# Patient Record
Sex: FEMALE | Race: WHITE | NOT HISPANIC OR LATINO | Employment: FULL TIME | URBAN - METROPOLITAN AREA
[De-identification: names, ages, dates, MRNs, and addresses within clinical notes are randomized per-mention and may not be internally consistent; named-entity substitution may affect disease eponyms.]

---

## 2020-12-07 PROCEDURE — U0003 INFECTIOUS AGENT DETECTION BY NUCLEIC ACID (DNA OR RNA); SEVERE ACUTE RESPIRATORY SYNDROME CORONAVIRUS 2 (SARS-COV-2) (CORONAVIRUS DISEASE [COVID-19]), AMPLIFIED PROBE TECHNIQUE, MAKING USE OF HIGH THROUGHPUT TECHNOLOGIES AS DESCRIBED BY CMS-2020-01-R: HCPCS | Performed by: FAMILY MEDICINE

## 2023-06-15 PROCEDURE — 93005 ELECTROCARDIOGRAM TRACING: CPT | Performed by: EMERGENCY MEDICINE

## 2023-06-15 PROCEDURE — 93005 ELECTROCARDIOGRAM TRACING: CPT

## 2023-06-16 ENCOUNTER — APPOINTMENT (OUTPATIENT)
Dept: MRI IMAGING | Facility: HOSPITAL | Age: 28
End: 2023-06-16
Payer: MEDICAID

## 2023-06-16 ENCOUNTER — HOSPITAL ENCOUNTER (OUTPATIENT)
Facility: HOSPITAL | Age: 28
Setting detail: OBSERVATION
Discharge: HOME | End: 2023-06-17
Attending: EMERGENCY MEDICINE | Admitting: EMERGENCY MEDICINE
Payer: MEDICAID

## 2023-06-16 ENCOUNTER — APPOINTMENT (OUTPATIENT)
Dept: GENERAL RADIOLOGY | Facility: HOSPITAL | Age: 28
End: 2023-06-16
Payer: MEDICAID

## 2023-06-16 ENCOUNTER — APPOINTMENT (OUTPATIENT)
Dept: CT IMAGING | Facility: HOSPITAL | Age: 28
End: 2023-06-16
Payer: MEDICAID

## 2023-06-16 ENCOUNTER — HOSPITAL ENCOUNTER (EMERGENCY)
Facility: HOSPITAL | Age: 28
Discharge: HOME | End: 2023-06-16
Attending: EMERGENCY MEDICINE
Payer: MEDICAID

## 2023-06-16 VITALS
OXYGEN SATURATION: 100 % | TEMPERATURE: 98.9 F | RESPIRATION RATE: 20 BRPM | WEIGHT: 293 LBS | SYSTOLIC BLOOD PRESSURE: 122 MMHG | HEART RATE: 88 BPM | DIASTOLIC BLOOD PRESSURE: 76 MMHG | BODY MASS INDEX: 48.82 KG/M2 | HEIGHT: 65 IN

## 2023-06-16 DIAGNOSIS — R51.9 ACUTE NONINTRACTABLE HEADACHE, UNSPECIFIED HEADACHE TYPE: Primary | ICD-10-CM

## 2023-06-16 DIAGNOSIS — R07.9 CHEST PAIN, UNSPECIFIED TYPE: ICD-10-CM

## 2023-06-16 DIAGNOSIS — R55 SYNCOPE AND COLLAPSE: Primary | ICD-10-CM

## 2023-06-16 LAB
ALBUMIN SERPL-MCNC: 4.2 G/DL (ref 3.5–5.2)
ALBUMIN SERPL-MCNC: 4.4 G/DL (ref 3.5–5.2)
ALBUMIN/GLOB SERPL: 1.3 G/DL
ALBUMIN/GLOB SERPL: 1.3 G/DL
ALP SERPL-CCNC: 58 U/L (ref 39–117)
ALP SERPL-CCNC: 59 U/L (ref 39–117)
ALT SERPL W P-5'-P-CCNC: 16 U/L (ref 1–33)
ALT SERPL W P-5'-P-CCNC: 25 U/L (ref 1–33)
ANION GAP SERPL CALCULATED.3IONS-SCNC: 13 MMOL/L (ref 5–15)
ANION GAP SERPL CALCULATED.3IONS-SCNC: 14 MMOL/L (ref 5–15)
AST SERPL-CCNC: 11 U/L (ref 1–32)
AST SERPL-CCNC: 26 U/L (ref 1–32)
B-HCG UR QL: NEGATIVE
BASOPHILS # BLD AUTO: 0.1 10*3/MM3 (ref 0–0.2)
BASOPHILS # BLD AUTO: 0.1 10*3/MM3 (ref 0–0.2)
BASOPHILS NFR BLD AUTO: 0.6 % (ref 0–1.5)
BASOPHILS NFR BLD AUTO: 0.9 % (ref 0–1.5)
BILIRUB SERPL-MCNC: 0.3 MG/DL (ref 0–1.2)
BILIRUB SERPL-MCNC: <0.2 MG/DL (ref 0–1.2)
BILIRUB UR QL STRIP: NEGATIVE
BUN SERPL-MCNC: 10 MG/DL (ref 6–20)
BUN SERPL-MCNC: 10 MG/DL (ref 6–20)
BUN/CREAT SERPL: 15.6 (ref 7–25)
BUN/CREAT SERPL: 17.5 (ref 7–25)
CALCIUM SPEC-SCNC: 9.2 MG/DL (ref 8.6–10.5)
CALCIUM SPEC-SCNC: 9.4 MG/DL (ref 8.6–10.5)
CHLORIDE SERPL-SCNC: 101 MMOL/L (ref 98–107)
CHLORIDE SERPL-SCNC: 102 MMOL/L (ref 98–107)
CLARITY UR: CLEAR
CO2 SERPL-SCNC: 24 MMOL/L (ref 22–29)
CO2 SERPL-SCNC: 24 MMOL/L (ref 22–29)
COLOR UR: YELLOW
CREAT SERPL-MCNC: 0.57 MG/DL (ref 0.57–1)
CREAT SERPL-MCNC: 0.64 MG/DL (ref 0.57–1)
D DIMER PPP FEU-MCNC: 0.61 MG/L (FEU) (ref 0–0.5)
DEPRECATED RDW RBC AUTO: 42.4 FL (ref 37–54)
DEPRECATED RDW RBC AUTO: 43.3 FL (ref 37–54)
EGFRCR SERPLBLD CKD-EPI 2021: 125.2 ML/MIN/1.73
EGFRCR SERPLBLD CKD-EPI 2021: 128.7 ML/MIN/1.73
EOSINOPHIL # BLD AUTO: 0.1 10*3/MM3 (ref 0–0.4)
EOSINOPHIL # BLD AUTO: 0.2 10*3/MM3 (ref 0–0.4)
EOSINOPHIL NFR BLD AUTO: 0.8 % (ref 0.3–6.2)
EOSINOPHIL NFR BLD AUTO: 1.3 % (ref 0.3–6.2)
ERYTHROCYTE [DISTWIDTH] IN BLOOD BY AUTOMATED COUNT: 14.7 % (ref 12.3–15.4)
ERYTHROCYTE [DISTWIDTH] IN BLOOD BY AUTOMATED COUNT: 14.9 % (ref 12.3–15.4)
GLOBULIN UR ELPH-MCNC: 3.2 GM/DL
GLOBULIN UR ELPH-MCNC: 3.3 GM/DL
GLUCOSE SERPL-MCNC: 115 MG/DL (ref 65–99)
GLUCOSE SERPL-MCNC: 124 MG/DL (ref 65–99)
GLUCOSE UR STRIP-MCNC: NEGATIVE MG/DL
HCT VFR BLD AUTO: 40.4 % (ref 34–46.6)
HCT VFR BLD AUTO: 42.2 % (ref 34–46.6)
HGB BLD-MCNC: 13.2 G/DL (ref 12–15.9)
HGB BLD-MCNC: 13.7 G/DL (ref 12–15.9)
HGB UR QL STRIP.AUTO: NEGATIVE
KETONES UR QL STRIP: NEGATIVE
LEUKOCYTE ESTERASE UR QL STRIP.AUTO: NEGATIVE
LYMPHOCYTES # BLD AUTO: 1.9 10*3/MM3 (ref 0.7–3.1)
LYMPHOCYTES # BLD AUTO: 2.6 10*3/MM3 (ref 0.7–3.1)
LYMPHOCYTES NFR BLD AUTO: 17.9 % (ref 19.6–45.3)
LYMPHOCYTES NFR BLD AUTO: 21.1 % (ref 19.6–45.3)
MAGNESIUM SERPL-MCNC: 2 MG/DL (ref 1.6–2.6)
MCH RBC QN AUTO: 25.4 PG (ref 26.6–33)
MCH RBC QN AUTO: 25.6 PG (ref 26.6–33)
MCHC RBC AUTO-ENTMCNC: 32.6 G/DL (ref 31.5–35.7)
MCHC RBC AUTO-ENTMCNC: 32.7 G/DL (ref 31.5–35.7)
MCV RBC AUTO: 77.9 FL (ref 79–97)
MCV RBC AUTO: 78.6 FL (ref 79–97)
MONOCYTES # BLD AUTO: 0.5 10*3/MM3 (ref 0.1–0.9)
MONOCYTES # BLD AUTO: 0.6 10*3/MM3 (ref 0.1–0.9)
MONOCYTES NFR BLD AUTO: 4.3 % (ref 5–12)
MONOCYTES NFR BLD AUTO: 5.3 % (ref 5–12)
NEUTROPHILS NFR BLD AUTO: 71.7 % (ref 42.7–76)
NEUTROPHILS NFR BLD AUTO: 76.1 % (ref 42.7–76)
NEUTROPHILS NFR BLD AUTO: 8.2 10*3/MM3 (ref 1.7–7)
NEUTROPHILS NFR BLD AUTO: 8.7 10*3/MM3 (ref 1.7–7)
NITRITE UR QL STRIP: NEGATIVE
NRBC BLD AUTO-RTO: 0.1 /100 WBC (ref 0–0.2)
NRBC BLD AUTO-RTO: 0.1 /100 WBC (ref 0–0.2)
PH UR STRIP.AUTO: 6.5 [PH] (ref 5–8)
PLATELET # BLD AUTO: 427 10*3/MM3 (ref 140–450)
PLATELET # BLD AUTO: 433 10*3/MM3 (ref 140–450)
PMV BLD AUTO: 8.1 FL (ref 6–12)
PMV BLD AUTO: 8.2 FL (ref 6–12)
POTASSIUM SERPL-SCNC: 3.9 MMOL/L (ref 3.5–5.2)
POTASSIUM SERPL-SCNC: 4.1 MMOL/L (ref 3.5–5.2)
PROT SERPL-MCNC: 7.4 G/DL (ref 6–8.5)
PROT SERPL-MCNC: 7.7 G/DL (ref 6–8.5)
PROT UR QL STRIP: NEGATIVE
RBC # BLD AUTO: 5.19 10*6/MM3 (ref 3.77–5.28)
RBC # BLD AUTO: 5.37 10*6/MM3 (ref 3.77–5.28)
SODIUM SERPL-SCNC: 139 MMOL/L (ref 136–145)
SODIUM SERPL-SCNC: 139 MMOL/L (ref 136–145)
SP GR UR STRIP: 1.01 (ref 1–1.03)
TROPONIN T SERPL HS-MCNC: <6 NG/L
TROPONIN T SERPL HS-MCNC: <6 NG/L
TSH SERPL DL<=0.05 MIU/L-ACNC: 1.89 UIU/ML (ref 0.27–4.2)
UROBILINOGEN UR QL STRIP: NORMAL
WBC NRBC COR # BLD: 10.8 10*3/MM3 (ref 3.4–10.8)
WBC NRBC COR # BLD: 12.2 10*3/MM3 (ref 3.4–10.8)

## 2023-06-16 PROCEDURE — 80050 GENERAL HEALTH PANEL: CPT | Performed by: NURSE PRACTITIONER

## 2023-06-16 PROCEDURE — A9579 GAD-BASE MR CONTRAST NOS,1ML: HCPCS | Performed by: EMERGENCY MEDICINE

## 2023-06-16 PROCEDURE — G0378 HOSPITAL OBSERVATION PER HR: HCPCS

## 2023-06-16 PROCEDURE — 85379 FIBRIN DEGRADATION QUANT: CPT | Performed by: NURSE PRACTITIONER

## 2023-06-16 PROCEDURE — 81003 URINALYSIS AUTO W/O SCOPE: CPT | Performed by: NURSE PRACTITIONER

## 2023-06-16 PROCEDURE — 70450 CT HEAD/BRAIN W/O DYE: CPT

## 2023-06-16 PROCEDURE — 25010000002 GADOTERIDOL PER 1 ML: Performed by: EMERGENCY MEDICINE

## 2023-06-16 PROCEDURE — 83735 ASSAY OF MAGNESIUM: CPT | Performed by: NURSE PRACTITIONER

## 2023-06-16 PROCEDURE — 71275 CT ANGIOGRAPHY CHEST: CPT

## 2023-06-16 PROCEDURE — 70549 MR ANGIOGRAPH NECK W/O&W/DYE: CPT

## 2023-06-16 PROCEDURE — 84484 ASSAY OF TROPONIN QUANT: CPT | Performed by: NURSE PRACTITIONER

## 2023-06-16 PROCEDURE — 70553 MRI BRAIN STEM W/O & W/DYE: CPT

## 2023-06-16 PROCEDURE — 25510000001 IOPAMIDOL PER 1 ML: Performed by: EMERGENCY MEDICINE

## 2023-06-16 PROCEDURE — 81025 URINE PREGNANCY TEST: CPT | Performed by: NURSE PRACTITIONER

## 2023-06-16 PROCEDURE — 85025 COMPLETE CBC W/AUTO DIFF WBC: CPT | Performed by: PHYSICIAN ASSISTANT

## 2023-06-16 PROCEDURE — 80053 COMPREHEN METABOLIC PANEL: CPT | Performed by: PHYSICIAN ASSISTANT

## 2023-06-16 PROCEDURE — 71045 X-RAY EXAM CHEST 1 VIEW: CPT

## 2023-06-16 PROCEDURE — 93005 ELECTROCARDIOGRAM TRACING: CPT

## 2023-06-16 PROCEDURE — 70544 MR ANGIOGRAPHY HEAD W/O DYE: CPT

## 2023-06-16 PROCEDURE — 84484 ASSAY OF TROPONIN QUANT: CPT | Performed by: PHYSICIAN ASSISTANT

## 2023-06-16 PROCEDURE — 93005 ELECTROCARDIOGRAM TRACING: CPT | Performed by: EMERGENCY MEDICINE

## 2023-06-16 RX ORDER — BISACODYL 5 MG/1
5 TABLET, DELAYED RELEASE ORAL DAILY PRN
Status: DISCONTINUED | OUTPATIENT
Start: 2023-06-16 | End: 2023-06-18 | Stop reason: HOSPADM

## 2023-06-16 RX ORDER — HYDROCHLOROTHIAZIDE 12.5 MG/1
25 TABLET ORAL DAILY
COMMUNITY

## 2023-06-16 RX ORDER — PANTOPRAZOLE SODIUM 20 MG/1
40 TABLET, DELAYED RELEASE ORAL DAILY
COMMUNITY

## 2023-06-16 RX ORDER — ACETAMINOPHEN 325 MG/1
650 TABLET ORAL EVERY 4 HOURS PRN
Status: DISCONTINUED | OUTPATIENT
Start: 2023-06-16 | End: 2023-06-18 | Stop reason: HOSPADM

## 2023-06-16 RX ORDER — SODIUM CHLORIDE 0.9 % (FLUSH) 0.9 %
10 SYRINGE (ML) INJECTION EVERY 12 HOURS SCHEDULED
Status: DISCONTINUED | OUTPATIENT
Start: 2023-06-16 | End: 2023-06-18 | Stop reason: HOSPADM

## 2023-06-16 RX ORDER — CHOLECALCIFEROL (VITAMIN D3) 125 MCG
5 CAPSULE ORAL NIGHTLY PRN
Status: DISCONTINUED | OUTPATIENT
Start: 2023-06-16 | End: 2023-06-18 | Stop reason: HOSPADM

## 2023-06-16 RX ORDER — ENOXAPARIN SODIUM 100 MG/ML
40 INJECTION SUBCUTANEOUS EVERY 12 HOURS
Status: DISCONTINUED | OUTPATIENT
Start: 2023-06-16 | End: 2023-06-18 | Stop reason: HOSPADM

## 2023-06-16 RX ORDER — AMOXICILLIN 250 MG
2 CAPSULE ORAL 2 TIMES DAILY
Status: DISCONTINUED | OUTPATIENT
Start: 2023-06-16 | End: 2023-06-18 | Stop reason: HOSPADM

## 2023-06-16 RX ORDER — SODIUM CHLORIDE 9 MG/ML
40 INJECTION, SOLUTION INTRAVENOUS AS NEEDED
Status: DISCONTINUED | OUTPATIENT
Start: 2023-06-16 | End: 2023-06-18 | Stop reason: HOSPADM

## 2023-06-16 RX ORDER — BISACODYL 10 MG
10 SUPPOSITORY, RECTAL RECTAL DAILY PRN
Status: DISCONTINUED | OUTPATIENT
Start: 2023-06-16 | End: 2023-06-18 | Stop reason: HOSPADM

## 2023-06-16 RX ORDER — ONDANSETRON 2 MG/ML
4 INJECTION INTRAMUSCULAR; INTRAVENOUS EVERY 6 HOURS PRN
Status: DISCONTINUED | OUTPATIENT
Start: 2023-06-16 | End: 2023-06-18 | Stop reason: HOSPADM

## 2023-06-16 RX ORDER — LISINOPRIL 5 MG/1
20 TABLET ORAL DAILY
COMMUNITY

## 2023-06-16 RX ORDER — POLYETHYLENE GLYCOL 3350 17 G/17G
17 POWDER, FOR SOLUTION ORAL DAILY PRN
Status: DISCONTINUED | OUTPATIENT
Start: 2023-06-16 | End: 2023-06-18 | Stop reason: HOSPADM

## 2023-06-16 RX ORDER — SODIUM CHLORIDE 450 MG/100ML
100 INJECTION, SOLUTION INTRAVENOUS CONTINUOUS
Status: DISCONTINUED | OUTPATIENT
Start: 2023-06-17 | End: 2023-06-18 | Stop reason: HOSPADM

## 2023-06-16 RX ORDER — NITROGLYCERIN 0.4 MG/1
0.4 TABLET SUBLINGUAL
Status: DISCONTINUED | OUTPATIENT
Start: 2023-06-16 | End: 2023-06-18 | Stop reason: HOSPADM

## 2023-06-16 RX ORDER — SODIUM CHLORIDE 0.9 % (FLUSH) 0.9 %
10 SYRINGE (ML) INJECTION AS NEEDED
Status: DISCONTINUED | OUTPATIENT
Start: 2023-06-16 | End: 2023-06-18 | Stop reason: HOSPADM

## 2023-06-16 RX ORDER — SODIUM CHLORIDE 0.9 % (FLUSH) 0.9 %
10 SYRINGE (ML) INJECTION AS NEEDED
Status: DISCONTINUED | OUTPATIENT
Start: 2023-06-16 | End: 2023-06-16 | Stop reason: HOSPADM

## 2023-06-16 RX ADMIN — GADOTERIDOL 20 ML: 279.3 INJECTION, SOLUTION INTRAVENOUS at 19:17

## 2023-06-16 RX ADMIN — SODIUM CHLORIDE 1000 ML: 9 INJECTION, SOLUTION INTRAVENOUS at 17:56

## 2023-06-16 RX ADMIN — SODIUM CHLORIDE 1000 ML: 9 INJECTION, SOLUTION INTRAVENOUS at 02:00

## 2023-06-16 RX ADMIN — ACETAMINOPHEN 650 MG: 325 TABLET, FILM COATED ORAL at 22:21

## 2023-06-16 RX ADMIN — IOPAMIDOL 100 ML: 755 INJECTION, SOLUTION INTRAVENOUS at 01:55

## 2023-06-16 NOTE — ED PROVIDER NOTES
Subjective     Provider in Triage Note  Patient is a 27-year-old  female history of asthma, bipolar, depression presents to the ER with complaints of syncopal episode today.  Patient was in the ER last night for similar complaints, had lab work, CT head and chest showing no acute abnormalities.  Patient states that she was sitting in a chair today when she stood up, felt lightheaded, states that she took a few steps and then passed out.  She is unsure if she hit her head.  She is complaining of a mild headache.  No lightheadedness or dizziness.  She does have some nausea.  No chest pain shortness of breath or abdominal pain.  No neck or back pain.  No unilateral weakness or paresthesias.  No recent changes in her medication.  No fever or chills.    History of Present Illness  Chief complaint: Patient is a 27-year-old female who presents after she passed out.  She was at work.  She went to stand and she been standing for a few moments and collapsed and passed out.  This happened late last night.  She was here in the emergency department and had CT scan of her head and chest.  Both were negative.  And symptoms returned today.  Except this time she did not have chest pain prior to the spell.  She does not have palpitations or chest pain currently.  No focal neurologic deficits that she had.  No fever.  No weakness.  No reason to be dehydrated recently.    Context:    Duration: Multiple episodes since yesterday    Timing: Sudden onset    Severity: Severe    Associated Symptoms:        PCP:  LMP:      Review of Systems   Eyes: Negative.    Respiratory:  Negative for cough.    Cardiovascular:  Negative for chest pain.   Gastrointestinal: Negative.    Genitourinary: Negative.    Musculoskeletal: Negative.    Skin: Negative.    Neurological:  Positive for dizziness, syncope and headaches.     Past Medical History:   Diagnosis Date    Asthma     Bipolar affective disorder in full remission     Depression     Menses,  irregular        Allergies   Allergen Reactions    Amoxicillin Unknown - High Severity    Penicillins Hives       Past Surgical History:   Procedure Laterality Date    ADENOIDECTOMY      DENTAL PROCEDURE      EAR TUBES      HYSTEROSCOPY      TONSILLECTOMY         No family history on file.    Social History     Socioeconomic History    Marital status:    Tobacco Use    Smoking status: Never    Smokeless tobacco: Never   Substance and Sexual Activity    Alcohol use: Yes     Comment: occ    Drug use: Never           Objective   Physical Exam  Vitals and nursing note reviewed.   Constitutional:       Appearance: Normal appearance.   HENT:      Head: Normocephalic and atraumatic.   Eyes:      Extraocular Movements: Extraocular movements intact.      Pupils: Pupils are equal, round, and reactive to light.   Cardiovascular:      Rate and Rhythm: Regular rhythm.      Heart sounds: Normal heart sounds.   Pulmonary:      Effort: Pulmonary effort is normal.      Breath sounds: Normal breath sounds.   Abdominal:      Tenderness: There is no abdominal tenderness.   Skin:     General: Skin is warm and dry.   Neurological:      General: No focal deficit present.      Mental Status: She is alert and oriented to person, place, and time.      Cranial Nerves: No cranial nerve deficit.      Sensory: No sensory deficit.      Motor: No weakness.      Coordination: Coordination normal.   Psychiatric:         Mood and Affect: Mood normal.         Thought Content: Thought content normal.       Procedures           ED Course      Results for orders placed or performed during the hospital encounter of 06/16/23   Comprehensive Metabolic Panel    Specimen: Arm, Right; Blood   Result Value Ref Range    Glucose 115 (H) 65 - 99 mg/dL    BUN 10 6 - 20 mg/dL    Creatinine 0.57 0.57 - 1.00 mg/dL    Sodium 139 136 - 145 mmol/L    Potassium 4.1 3.5 - 5.2 mmol/L    Chloride 102 98 - 107 mmol/L    CO2 24.0 22.0 - 29.0 mmol/L    Calcium 9.4 8.6 -  10.5 mg/dL    Total Protein 7.7 6.0 - 8.5 g/dL    Albumin 4.4 3.5 - 5.2 g/dL    ALT (SGPT) 25 1 - 33 U/L    AST (SGOT) 26 1 - 32 U/L    Alkaline Phosphatase 59 39 - 117 U/L    Total Bilirubin 0.3 0.0 - 1.2 mg/dL    Globulin 3.3 gm/dL    A/G Ratio 1.3 g/dL    BUN/Creatinine Ratio 17.5 7.0 - 25.0    Anion Gap 13.0 5.0 - 15.0 mmol/L    eGFR 128.7 >60.0 mL/min/1.73   Single High Sensitivity Troponin T    Specimen: Arm, Right; Blood   Result Value Ref Range    HS Troponin T <6 <10 ng/L   CBC Auto Differential    Specimen: Arm, Right; Blood   Result Value Ref Range    WBC 10.80 3.40 - 10.80 10*3/mm3    RBC 5.37 (H) 3.77 - 5.28 10*6/mm3    Hemoglobin 13.7 12.0 - 15.9 g/dL    Hematocrit 42.2 34.0 - 46.6 %    MCV 78.6 (L) 79.0 - 97.0 fL    MCH 25.6 (L) 26.6 - 33.0 pg    MCHC 32.6 31.5 - 35.7 g/dL    RDW 14.9 12.3 - 15.4 %    RDW-SD 43.3 37.0 - 54.0 fl    MPV 8.1 6.0 - 12.0 fL    Platelets 433 140 - 450 10*3/mm3    Neutrophil % 76.1 (H) 42.7 - 76.0 %    Lymphocyte % 17.9 (L) 19.6 - 45.3 %    Monocyte % 4.3 (L) 5.0 - 12.0 %    Eosinophil % 0.8 0.3 - 6.2 %    Basophil % 0.9 0.0 - 1.5 %    Neutrophils, Absolute 8.20 (H) 1.70 - 7.00 10*3/mm3    Lymphocytes, Absolute 1.90 0.70 - 3.10 10*3/mm3    Monocytes, Absolute 0.50 0.10 - 0.90 10*3/mm3    Eosinophils, Absolute 0.10 0.00 - 0.40 10*3/mm3    Basophils, Absolute 0.10 0.00 - 0.20 10*3/mm3    nRBC 0.1 0.0 - 0.2 /100 WBC   ECG 12 Lead Syncope   Result Value Ref Range    QT Interval 344 ms             CT Head Without Contrast    Result Date: 6/16/2023  No acute intracranial abnormality. Electronically signed by:  Edu Stock M.D.  6/15/2023 11:44 PM Mountain Time    MRI Angiogram Head Without Contrast    Result Date: 6/16/2023  No evidence of vascular occlusion. No high-grade stenosis. No evidence of aneurysm. Grossly unremarkable exam Electronically Signed: Anatoly Wu  6/16/2023 7:31 PM EDT  Workstation ID: [id number]    MRI Angiogram Neck With & Without Contrast    Result  Date: 6/16/2023  Unremarkable magnetic resonance angiography of the carotid circulation. Electronically Signed: Anatoly Bryce  6/16/2023 7:31 PM EDT  Workstation ID: [id number]    MRI Brain With & Without Contrast    Result Date: 6/16/2023  1.No acute intracranial infarction or hemorrhage. 2.No abnormal enhancement. Electronically Signed: Anatoly Wu  6/16/2023 7:39 PM EDT  Workstation ID: [id number]    XR Chest 1 View    Result Date: 6/16/2023  No acute cardiopulmonary process. Electronically signed by:  Ray Pang D.O.  6/15/2023 11:11 PM Mountain Time    CT Angiogram Chest Pulmonary Embolism    Result Date: 6/16/2023  1. No evidence of pulmonary embolism. 2. No evidence of thoracic aortic aneurysm or dissection. 3. No evidence of pneumonia, pleural or pericardial effusions. Electronically signed by:  Ray Pang D.O.  6/16/2023 12:13 AM Mountain Time                                Medical Decision Making  Patient was seen and evaluated for above problem    Differential diagnosis includes was not limited to cardiogenic syncope, orthostasis, intracranial aneurysm    Patient had CT head negative and CT chest negative yesterday.  Ruled out PE, dissection and intracerebral hemorrhage.  However she again had a syncopal event.  No chest pain or palpitations preceding at this time.  She did have a headache again.  Secondary to this MRI and MRA was obtained.  No signs of intracranial aneurysm.  No abnormality.  She is neurologically intact.  But with multiple syncopal events will place in the ED observation unit for specialty consultation.  She verbalizes understanding and is okay with this.  EKG interpreted by myself shows sinus rhythm rate of 99.    Problems Addressed:  Syncope and collapse: complicated acute illness or injury    Amount and/or Complexity of Data Reviewed  Labs: ordered. Decision-making details documented in ED Course.     Details: Labs reviewed by myself  Radiology: ordered and independent  interpretation performed.     Details: MRI reviewed by myself as above.  Chest x-ray no acute cardiopulmonary process  ECG/medicine tests: ordered and independent interpretation performed.     Details: Interpreted by myself    Risk  Prescription drug management.  Decision regarding hospitalization.        Final diagnoses:   None   Syncope and collapse    ED Disposition  ED Disposition       None            No follow-up provider specified.       Medication List      No changes were made to your prescriptions during this visit.            Jose Adams DO  06/16/23 2004

## 2023-06-16 NOTE — DISCHARGE INSTRUCTIONS
Continue current home medications.  Drink plenty of fluids.  Rest today and tomorrow.  Follow-up primary care provider.  Return for new or worsening symptoms.

## 2023-06-16 NOTE — Clinical Note
[facility] EMERGENCY DEPARTMENT  [address] [place] [address]  Phone: [phone]    Jazmine Read was seen and treated in our emergency department on 6/15/2023.  She may return to work on 06/18/2023.         Thank you for choosing [facility].    Norah Wick APRN

## 2023-06-16 NOTE — LETTER
June 17, 2023     Patient: Jazmine Read   YOB: 1995   Date of Visit: 6/16/2023       To Whom It May Concern:    It is my medical opinion that Jazmine Read may be excused from work 6-15-23 through 6-20-23. She was a patient here from 6-16-23 through 6-17-23. She will follow up with her physician 6-20-23. She may return to work once released from her physician. Per neurology, no driving for 3 months due to unexplained syncopal and collapse.            Sincerely,      Dr. Bryan Riley RN

## 2023-06-16 NOTE — Clinical Note
[facility] EMERGENCY DEPARTMENT  [address] [place] [address]  Phone: [phone]    Jazmine Read was seen and treated in our emergency department on 6/15/2023.  She may return to work on 06/18/2023.         Thank you for choosing [facility].    Norah Wick APRN

## 2023-06-16 NOTE — ED NOTES
Pt reports while at work today she passed out, pt reports around 2 hours prior to syncopal episode she had a HA and felt a little dizzy, pt reports upon standing she passed out and woke up on the floor. Pt reports she hit her posterior head on floor when passed out. Pt reports had same episode last night while at home and was seen here-had labs and CT of head done with dx of increased stress and HA.

## 2023-06-17 ENCOUNTER — APPOINTMENT (OUTPATIENT)
Dept: CARDIOLOGY | Facility: HOSPITAL | Age: 28
End: 2023-06-17
Payer: MEDICAID

## 2023-06-17 ENCOUNTER — APPOINTMENT (OUTPATIENT)
Dept: RESPIRATORY THERAPY | Facility: HOSPITAL | Age: 28
End: 2023-06-17
Payer: MEDICAID

## 2023-06-17 ENCOUNTER — APPOINTMENT (OUTPATIENT)
Dept: GENERAL RADIOLOGY | Facility: HOSPITAL | Age: 28
End: 2023-06-17
Payer: MEDICAID

## 2023-06-17 VITALS
BODY MASS INDEX: 47.09 KG/M2 | WEIGHT: 293 LBS | RESPIRATION RATE: 18 BRPM | TEMPERATURE: 97.9 F | HEIGHT: 66 IN | HEART RATE: 102 BPM | SYSTOLIC BLOOD PRESSURE: 118 MMHG | OXYGEN SATURATION: 98 % | DIASTOLIC BLOOD PRESSURE: 75 MMHG

## 2023-06-17 LAB
ANION GAP SERPL CALCULATED.3IONS-SCNC: 10 MMOL/L (ref 5–15)
BASOPHILS # BLD AUTO: 0 10*3/MM3 (ref 0–0.2)
BASOPHILS NFR BLD AUTO: 0.5 % (ref 0–1.5)
BH CV ECHO MEAS - ACS: 1.4 CM
BH CV ECHO MEAS - AO MAX PG: 4.3 MMHG
BH CV ECHO MEAS - AO MEAN PG: 2 MMHG
BH CV ECHO MEAS - AO ROOT DIAM: 2.6 CM
BH CV ECHO MEAS - AO V2 MAX: 104 CM/SEC
BH CV ECHO MEAS - AO V2 VTI: 21.6 CM
BH CV ECHO MEAS - AVA(I,D): 3 CM2
BH CV ECHO MEAS - EDV(CUBED): 46.7 ML
BH CV ECHO MEAS - EDV(MOD-SP2): 88.6 ML
BH CV ECHO MEAS - EDV(MOD-SP4): 82.9 ML
BH CV ECHO MEAS - EF(MOD-BP): 54.9 %
BH CV ECHO MEAS - EF(MOD-SP2): 52 %
BH CV ECHO MEAS - EF(MOD-SP4): 53.9 %
BH CV ECHO MEAS - ESV(CUBED): 15.6 ML
BH CV ECHO MEAS - ESV(MOD-SP2): 42.5 ML
BH CV ECHO MEAS - ESV(MOD-SP4): 38.2 ML
BH CV ECHO MEAS - FS: 30.6 %
BH CV ECHO MEAS - IVS/LVPW: 1 CM
BH CV ECHO MEAS - IVSD: 0.9 CM
BH CV ECHO MEAS - LA DIMENSION: 2.7 CM
BH CV ECHO MEAS - LAT PEAK E' VEL: 11.5 CM/SEC
BH CV ECHO MEAS - LV DIASTOLIC VOL/BSA (35-75): 34.5 CM2
BH CV ECHO MEAS - LV MASS(C)D: 92.8 GRAMS
BH CV ECHO MEAS - LV MAX PG: 4 MMHG
BH CV ECHO MEAS - LV MEAN PG: 2 MMHG
BH CV ECHO MEAS - LV SYSTOLIC VOL/BSA (12-30): 15.9 CM2
BH CV ECHO MEAS - LV V1 MAX: 100 CM/SEC
BH CV ECHO MEAS - LV V1 VTI: 22.5 CM
BH CV ECHO MEAS - LVIDD: 3.6 CM
BH CV ECHO MEAS - LVIDS: 2.5 CM
BH CV ECHO MEAS - LVOT AREA: 2.8 CM2
BH CV ECHO MEAS - LVOT DIAM: 1.9 CM
BH CV ECHO MEAS - LVPWD: 0.9 CM
BH CV ECHO MEAS - MED PEAK E' VEL: 10.9 CM/SEC
BH CV ECHO MEAS - MV A MAX VEL: 57.1 CM/SEC
BH CV ECHO MEAS - MV DEC SLOPE: 650 CM/SEC2
BH CV ECHO MEAS - MV DEC TIME: 0.14 MSEC
BH CV ECHO MEAS - MV E MAX VEL: 87 CM/SEC
BH CV ECHO MEAS - MV E/A: 1.52
BH CV ECHO MEAS - MV MAX PG: 3.6 MMHG
BH CV ECHO MEAS - MV MEAN PG: 2 MMHG
BH CV ECHO MEAS - MV P1/2T: 43.8 MSEC
BH CV ECHO MEAS - MV V2 VTI: 23.1 CM
BH CV ECHO MEAS - MVA(P1/2T): 5 CM2
BH CV ECHO MEAS - MVA(VTI): 2.8 CM2
BH CV ECHO MEAS - PA V2 MAX: 73.3 CM/SEC
BH CV ECHO MEAS - QP/QS: 0.65
BH CV ECHO MEAS - RAP SYSTOLE: 3 MMHG
BH CV ECHO MEAS - RV MAX PG: 1.14 MMHG
BH CV ECHO MEAS - RV V1 MAX: 53.5 CM/SEC
BH CV ECHO MEAS - RV V1 VTI: 13.1 CM
BH CV ECHO MEAS - RVOT DIAM: 2 CM
BH CV ECHO MEAS - RVSP: 26 MMHG
BH CV ECHO MEAS - SI(MOD-SP2): 19.2 ML/M2
BH CV ECHO MEAS - SI(MOD-SP4): 18.6 ML/M2
BH CV ECHO MEAS - SV(LVOT): 63.8 ML
BH CV ECHO MEAS - SV(MOD-SP2): 46.1 ML
BH CV ECHO MEAS - SV(MOD-SP4): 44.7 ML
BH CV ECHO MEAS - SV(RVOT): 41.2 ML
BH CV ECHO MEAS - TR MAX PG: 23.4 MMHG
BH CV ECHO MEAS - TR MAX VEL: 242 CM/SEC
BH CV ECHO MEASUREMENTS AVERAGE E/E' RATIO: 7.77
BH CV XLRA - RV BASE: 2.9 CM
BH CV XLRA - RV LENGTH: 6.5 CM
BH CV XLRA - RV MID: 2.4 CM
BH CV XLRA - TDI S': 8.3 CM/SEC
BUN SERPL-MCNC: 15 MG/DL (ref 6–20)
BUN/CREAT SERPL: 25.4 (ref 7–25)
CALCIUM SPEC-SCNC: 8.8 MG/DL (ref 8.6–10.5)
CHLORIDE SERPL-SCNC: 105 MMOL/L (ref 98–107)
CO2 SERPL-SCNC: 24 MMOL/L (ref 22–29)
CREAT SERPL-MCNC: 0.59 MG/DL (ref 0.57–1)
DEPRECATED RDW RBC AUTO: 42 FL (ref 37–54)
EGFRCR SERPLBLD CKD-EPI 2021: 127.7 ML/MIN/1.73
EOSINOPHIL # BLD AUTO: 0.1 10*3/MM3 (ref 0–0.4)
EOSINOPHIL NFR BLD AUTO: 1.7 % (ref 0.3–6.2)
ERYTHROCYTE [DISTWIDTH] IN BLOOD BY AUTOMATED COUNT: 15.1 % (ref 12.3–15.4)
GLUCOSE SERPL-MCNC: 105 MG/DL (ref 65–99)
HCT VFR BLD AUTO: 39.6 % (ref 34–46.6)
HGB BLD-MCNC: 12.6 G/DL (ref 12–15.9)
LYMPHOCYTES # BLD AUTO: 2 10*3/MM3 (ref 0.7–3.1)
LYMPHOCYTES NFR BLD AUTO: 28 % (ref 19.6–45.3)
MAXIMAL PREDICTED HEART RATE: 194 BPM
MCH RBC QN AUTO: 25.8 PG (ref 26.6–33)
MCHC RBC AUTO-ENTMCNC: 31.9 G/DL (ref 31.5–35.7)
MCV RBC AUTO: 81 FL (ref 79–97)
MONOCYTES # BLD AUTO: 0.4 10*3/MM3 (ref 0.1–0.9)
MONOCYTES NFR BLD AUTO: 6.1 % (ref 5–12)
NEUTROPHILS NFR BLD AUTO: 4.5 10*3/MM3 (ref 1.7–7)
NEUTROPHILS NFR BLD AUTO: 63.7 % (ref 42.7–76)
NRBC BLD AUTO-RTO: 0.1 /100 WBC (ref 0–0.2)
PLATELET # BLD AUTO: 391 10*3/MM3 (ref 140–450)
PMV BLD AUTO: 7.9 FL (ref 6–12)
POTASSIUM SERPL-SCNC: 4.4 MMOL/L (ref 3.5–5.2)
QT INTERVAL: 323 MS
QT INTERVAL: 344 MS
RBC # BLD AUTO: 4.89 10*6/MM3 (ref 3.77–5.28)
SINUS: 2.9 CM
SODIUM SERPL-SCNC: 139 MMOL/L (ref 136–145)
STJ: 2.6 CM
STRESS TARGET HR: 165 BPM
TROPONIN T SERPL HS-MCNC: <6 NG/L
WBC NRBC COR # BLD: 7.1 10*3/MM3 (ref 3.4–10.8)

## 2023-06-17 PROCEDURE — 85025 COMPLETE CBC W/AUTO DIFF WBC: CPT | Performed by: EMERGENCY MEDICINE

## 2023-06-17 PROCEDURE — 80048 BASIC METABOLIC PNL TOTAL CA: CPT | Performed by: EMERGENCY MEDICINE

## 2023-06-17 PROCEDURE — 73564 X-RAY EXAM KNEE 4 OR MORE: CPT

## 2023-06-17 PROCEDURE — 93306 TTE W/DOPPLER COMPLETE: CPT

## 2023-06-17 PROCEDURE — 84484 ASSAY OF TROPONIN QUANT: CPT | Performed by: EMERGENCY MEDICINE

## 2023-06-17 PROCEDURE — 25010000002 ENOXAPARIN PER 10 MG: Performed by: EMERGENCY MEDICINE

## 2023-06-17 PROCEDURE — 25010000002 SULFUR HEXAFLUORIDE MICROSPH 60.7-25 MG RECONSTITUTED SUSPENSION: Performed by: EMERGENCY MEDICINE

## 2023-06-17 PROCEDURE — G0378 HOSPITAL OBSERVATION PER HR: HCPCS

## 2023-06-17 PROCEDURE — 25010000002 KETOROLAC TROMETHAMINE PER 15 MG: Performed by: EMERGENCY MEDICINE

## 2023-06-17 RX ORDER — PANTOPRAZOLE SODIUM 40 MG/1
40 TABLET, DELAYED RELEASE ORAL DAILY
Status: DISCONTINUED | OUTPATIENT
Start: 2023-06-17 | End: 2023-06-18 | Stop reason: HOSPADM

## 2023-06-17 RX ORDER — KETOROLAC TROMETHAMINE 15 MG/ML
15 INJECTION, SOLUTION INTRAMUSCULAR; INTRAVENOUS ONCE
Status: COMPLETED | OUTPATIENT
Start: 2023-06-17 | End: 2023-06-17

## 2023-06-17 RX ORDER — TOPIRAMATE 25 MG/1
TABLET ORAL
Qty: 21 TABLET | Refills: 0 | Status: SHIPPED | OUTPATIENT
Start: 2023-06-17

## 2023-06-17 RX ORDER — HYDROCHLOROTHIAZIDE 25 MG/1
25 TABLET ORAL DAILY
Status: DISCONTINUED | OUTPATIENT
Start: 2023-06-17 | End: 2023-06-18 | Stop reason: HOSPADM

## 2023-06-17 RX ORDER — LISINOPRIL 20 MG/1
20 TABLET ORAL DAILY
Status: DISCONTINUED | OUTPATIENT
Start: 2023-06-17 | End: 2023-06-18 | Stop reason: HOSPADM

## 2023-06-17 RX ADMIN — LISINOPRIL 20 MG: 20 TABLET ORAL at 09:49

## 2023-06-17 RX ADMIN — ENOXAPARIN SODIUM 40 MG: 100 INJECTION SUBCUTANEOUS at 00:17

## 2023-06-17 RX ADMIN — Medication 10 ML: at 09:51

## 2023-06-17 RX ADMIN — CARIPRAZINE 3 MG: 3 CAPSULE, GELATIN COATED ORAL at 09:50

## 2023-06-17 RX ADMIN — KETOROLAC TROMETHAMINE 15 MG: 15 INJECTION, SOLUTION INTRAMUSCULAR; INTRAVENOUS at 00:17

## 2023-06-17 RX ADMIN — SODIUM CHLORIDE 100 ML/HR: 4.5 INJECTION, SOLUTION INTRAVENOUS at 09:32

## 2023-06-17 RX ADMIN — SULFUR HEXAFLUORIDE 2 ML: KIT at 09:09

## 2023-06-17 RX ADMIN — ACETAMINOPHEN 650 MG: 325 TABLET, FILM COATED ORAL at 10:27

## 2023-06-17 RX ADMIN — ENOXAPARIN SODIUM 40 MG: 100 INJECTION SUBCUTANEOUS at 10:02

## 2023-06-17 RX ADMIN — PANTOPRAZOLE SODIUM 40 MG: 40 TABLET, DELAYED RELEASE ORAL at 09:49

## 2023-06-17 RX ADMIN — SODIUM CHLORIDE 100 ML/HR: 4.5 INJECTION, SOLUTION INTRAVENOUS at 00:21

## 2023-06-17 RX ADMIN — HYDROCHLOROTHIAZIDE 25 MG: 25 TABLET ORAL at 09:49

## 2023-06-17 NOTE — DISCHARGE SUMMARY
[facility]    Deepti Mejía PA    CHIEF COMPLAINT:     Syncope    HISTORY OF PRESENT ILLNESS:    Obtained from admitting physician SCOTTY on 6/16/2023:  History of Present Illness  Patient is a 27-year-old obese white female history of migraine headaches presents today with complaints of headache, chest pain and syncopal episode.  She states she was at work today when she developed gradual onset frontal headache, more severe than her typical migraines.  She did take some Excedrin Migraine without improvement in her headache.  She states she felt a bit dizzy with this.  No thunderclap onset.  No visual changes.  No unilateral weakness or deficit.  She states about an hour later she developed some pain in the center of her chest more right-sided that is worse with certain movements.  She states she was then walking around at the store she started to feel lightheaded.  Her significant other at the bedside states that she then just fell over.  He states that she passed out for a few seconds but he was able to help her up.  She states she thinks she hit the back of her head when she did fall.  She denies any palpitations, shortness of breath, vomiting neck pain abdominal pain leg pain swelling recent travel prolonged immobilization or other complaint at this time.      06/17/23:  Patient confirms the HPI noted above including 2 separate syncopal episodes which have occurred over the past 4 days the first of which was preceded by some chest pain but the most recent event had no significant pain associated with it.  In both instances she has had a mild headache as well as some dizziness following the event and is also noted some nausea but denies any other associated prodrome or continuous symptoms following her events.  Family present with patient notes that her episodes tend to last for seconds and that while she is somewhat confused following these events they did not note any convulsions or  seizure-like activity.  No recent changes to her medications are present and she has never been evaluated for NATHALIE.          Past Medical History:   Diagnosis Date    Anxiety     Asthma     Bipolar affective disorder in full remission     Depression     Depression     Fatty liver disease, nonalcoholic     Hernia of abdominal cavity     Hypertension     Menses, irregular      Past Surgical History:   Procedure Laterality Date    ADENOIDECTOMY      DENTAL PROCEDURE      EAR TUBES      HYSTEROSCOPY      TONSILLECTOMY       History reviewed. No pertinent family history.  Social History     Tobacco Use    Smoking status: Never     Passive exposure: Never    Smokeless tobacco: Never   Vaping Use    Vaping Use: Never used   Substance Use Topics    Alcohol use: Yes     Alcohol/week: 1.0 standard drink     Types: 1 Glasses of wine per week     Comment: occ    Drug use: Never     Medications Prior to Admission   Medication Sig Dispense Refill Last Dose    Cariprazine HCl (VRAYLAR) 3 MG capsule capsule Take 1 mg by mouth Daily.   Past Week    FLUoxetine HCl, PMDD, 20 MG tablet Take  by mouth.   Past Week    hydroCHLOROthiazide (HYDRODIURIL) 12.5 MG tablet Take 2 tablets by mouth Daily.   Past Week    lisinopril (PRINIVIL,ZESTRIL) 5 MG tablet Take 4 tablets by mouth Daily.   Past Week    pantoprazole (PROTONIX) 20 MG EC tablet Take 2 tablets by mouth Daily.   Past Week     Allergies:  Amoxicillin and Penicillins      There is no immunization history on file for this patient.        REVIEW OF SYSTEMS:    Review of Systems   Constitutional: Negative.   HENT: Negative.     Eyes: Negative.    Cardiovascular:  Positive for chest pain and syncope.   Respiratory: Negative.     Skin: Negative.    Musculoskeletal: Negative.    Gastrointestinal:  Positive for nausea.   Genitourinary: Negative.    Psychiatric/Behavioral: Negative.         Vital Signs  Temp:  [97.8 °F (36.6 °C)-98 °F (36.7 °C)] 97.8 °F (36.6 °C)  Heart Rate:  []  86  Resp:  [17] 17  BP: (105-138)/(58-82) 109/65          Physical Exam:  Physical Exam  Vitals reviewed.   Constitutional:       General: She is not in acute distress.     Appearance: Normal appearance. She is obese. She is not ill-appearing, toxic-appearing or diaphoretic.   HENT:      Head: Normocephalic.      Right Ear: External ear normal.      Left Ear: External ear normal.      Nose: Nose normal.      Mouth/Throat:      Mouth: Mucous membranes are moist.   Eyes:      Extraocular Movements: Extraocular movements intact.   Cardiovascular:      Rate and Rhythm: Normal rate and regular rhythm.      Pulses: Normal pulses.   Pulmonary:      Effort: Pulmonary effort is normal.      Breath sounds: Normal breath sounds.   Abdominal:      General: Bowel sounds are normal.      Palpations: Abdomen is soft.   Musculoskeletal:      Cervical back: Normal range of motion.      Right lower leg: No edema.      Left lower leg: No edema.   Skin:     General: Skin is warm and dry.      Capillary Refill: Capillary refill takes less than 2 seconds.   Neurological:      General: No focal deficit present.      Mental Status: She is alert.   Psychiatric:         Mood and Affect: Mood normal.         Behavior: Behavior normal.         Thought Content: Thought content normal.         Judgment: Judgment normal.         Emotional Behavior:   Normal   Debilities:  None  Results Review:    I reviewed the patient's new clinical results.  Lab Results (most recent)       Procedure Component Value Units Date/Time    Basic Metabolic Panel [[id number]]  (Abnormal) Collected: 06/17/23 0939    Specimen: Blood from Hand, Left Updated: 06/17/23 1018     Glucose 105 mg/dL      BUN 15 mg/dL      Creatinine 0.59 mg/dL      Sodium 139 mmol/L      Potassium 4.4 mmol/L      Chloride 105 mmol/L      CO2 24.0 mmol/L      Calcium 8.8 mg/dL      BUN/Creatinine Ratio 25.4     Anion Gap 10.0 mmol/L      eGFR 127.7 mL/min/1.73     Narrative:      GFR Normal  >60  Chronic Kidney Disease <60  Kidney Failure <15      High Sensitivity Troponin T [[id number]]  (Normal) Collected: 06/17/23 0939    Specimen: Blood from Hand, Left Updated: 06/17/23 1017     HS Troponin T <6 ng/L     Narrative:      High Sensitive Troponin T Reference Range:  <10.0 ng/L- Negative Female for AMI  <15.0 ng/L- Negative Male for AMI  >=10 - Abnormal Female indicating possible myocardial injury.  >=15 - Abnormal Male indicating possible myocardial injury.   Clinicians would have to utilize clinical acumen, EKG, Troponin, and serial changes to determine if it is an Acute Myocardial Infarction or myocardial injury due to an underlying chronic condition.         CBC Auto Differential [[id number]]  (Abnormal) Collected: 06/17/23 0939    Specimen: Blood from Hand, Left Updated: 06/17/23 0949     WBC 7.10 10*3/mm3      RBC 4.89 10*6/mm3      Hemoglobin 12.6 g/dL      Hematocrit 39.6 %      MCV 81.0 fL      MCH 25.8 pg      MCHC 31.9 g/dL      RDW 15.1 %      RDW-SD 42.0 fl      MPV 7.9 fL      Platelets 391 10*3/mm3      Neutrophil % 63.7 %      Lymphocyte % 28.0 %      Monocyte % 6.1 %      Eosinophil % 1.7 %      Basophil % 0.5 %      Neutrophils, Absolute 4.50 10*3/mm3      Lymphocytes, Absolute 2.00 10*3/mm3      Monocytes, Absolute 0.40 10*3/mm3      Eosinophils, Absolute 0.10 10*3/mm3      Basophils, Absolute 0.00 10*3/mm3      nRBC 0.1 /100 WBC     Single High Sensitivity Troponin T [[id number]]  (Normal) Collected: 06/16/23 1604    Specimen: Blood from Arm, Right Updated: 06/16/23 1644     HS Troponin T <6 ng/L     Narrative:      High Sensitive Troponin T Reference Range:  <10.0 ng/L- Negative Female for AMI  <15.0 ng/L- Negative Male for AMI  >=10 - Abnormal Female indicating possible myocardial injury.  >=15 - Abnormal Male indicating possible myocardial injury.   Clinicians would have to utilize clinical acumen, EKG, Troponin, and serial changes to determine if it is an Acute Myocardial Infarction  or myocardial injury due to an underlying chronic condition.         Comprehensive Metabolic Panel [[id number]]  (Abnormal) Collected: 06/16/23 1604    Specimen: Blood from Arm, Right Updated: 06/16/23 1640     Glucose 115 mg/dL      BUN 10 mg/dL      Creatinine 0.57 mg/dL      Sodium 139 mmol/L      Potassium 4.1 mmol/L      Chloride 102 mmol/L      CO2 24.0 mmol/L      Calcium 9.4 mg/dL      Total Protein 7.7 g/dL      Albumin 4.4 g/dL      ALT (SGPT) 25 U/L      AST (SGOT) 26 U/L      Alkaline Phosphatase 59 U/L      Total Bilirubin 0.3 mg/dL      Globulin 3.3 gm/dL      A/G Ratio 1.3 g/dL      BUN/Creatinine Ratio 17.5     Anion Gap 13.0 mmol/L      eGFR 128.7 mL/min/1.73     Narrative:      GFR Normal >60  Chronic Kidney Disease <60  Kidney Failure <15      CBC & Differential [[id number]]  (Abnormal) Collected: 06/16/23 1604    Specimen: Blood from Arm, Right Updated: 06/16/23 1612    Narrative:      The following orders were created for panel order CBC & Differential.  Procedure                               Abnormality         Status                     ---------                               -----------         ------                     CBC Auto Differential[[id number]]        Abnormal            Final result                 Please view results for these tests on the individual orders.    CBC Auto Differential [[id number]]  (Abnormal) Collected: 06/16/23 1604    Specimen: Blood from Arm, Right Updated: 06/16/23 1612     WBC 10.80 10*3/mm3      RBC 5.37 10*6/mm3      Hemoglobin 13.7 g/dL      Hematocrit 42.2 %      MCV 78.6 fL      MCH 25.6 pg      MCHC 32.6 g/dL      RDW 14.9 %      RDW-SD 43.3 fl      MPV 8.1 fL      Platelets 433 10*3/mm3      Neutrophil % 76.1 %      Lymphocyte % 17.9 %      Monocyte % 4.3 %      Eosinophil % 0.8 %      Basophil % 0.9 %      Neutrophils, Absolute 8.20 10*3/mm3      Lymphocytes, Absolute 1.90 10*3/mm3      Monocytes, Absolute 0.50 10*3/mm3      Eosinophils, Absolute 0.10  10*3/mm3      Basophils, Absolute 0.10 10*3/mm3      nRBC 0.1 /100 WBC             Imaging Results (Most Recent)       Procedure Component Value Units Date/Time    MRI Brain With & Without Contrast [[id number]] Collected: 06/16/23 1934     Updated: 06/16/23 1941    Narrative:      MRI BRAIN W WO CONTRAST    Date of Exam: 6/16/2023 7:10 AM EDT    Indication: headache syncope.     Comparison: None available.    Technique:  Routine multiplanar/multisequence sequence images of the brain were obtained before and after the uneventful administration of contrast.      Findings:      *No acute intracranial infarction.  *No acute intracranial hemorrhage.  *No masses, mass effect, midline shift or hydrocephalus.  *Pituitary gland, brainstem, and craniocervical junction are unremarkable.  *Main intracranial flow-voids are patent.  *Orbits and globes are unremarkable.    There is no abnormal enhancement.      *        Impression:      1.No acute intracranial infarction or hemorrhage.    2.No abnormal enhancement.              Electronically Signed: Anatoly Wu    6/16/2023 7:39 PM EDT    Workstation ID: [id number]    MRI Angiogram Head Without Contrast [[id number]] Collected: 06/16/23 1924     Updated: 06/16/23 1933    Narrative:      MRI ANGIOGRAM HEAD WO CONTRAST    Date of Exam: 6/16/2023 6:25 PM EDT    Indication: headache syncope.     Comparison: None available.    Technique:  Routine 3-D time-of-flight gradient echo imaging was obtained of the head without contrast administration.      Findings:      The bilateral petrous, clinoid and supraclinoid internal carotid arteries are unremarkable the bilateral anterior cerebral arteries demonstrate no abnormality.    The bilateral middle cerebral arteries demonstrate no abnormality.    The V4 segments of the vertebral arteries, the vertebrobasilar junction and basilar artery are unremarkable.    The bilateral posterior cerebral arteries are unremarkable.      Impression:         No evidence of vascular occlusion. No high-grade stenosis. No evidence of aneurysm.    Grossly unremarkable exam                Electronically Signed: Anatoly Zacariasano    6/16/2023 7:31 PM EDT    Workstation ID: [id number]    MRI Angiogram Neck With & Without Contrast [[id number]] Collected: 06/16/23 1926     Updated: 06/16/23 1933    Narrative:      MRI ANGIOGRAM NECK W WO CONTRAST    Date of Exam: 6/16/2023 6:31 PM EDT    Indication: headache syncope.     Comparison: None available.    Technique:  Routine 3-D time-of-flight gradient echo imaging was obtained of the neck before and after the uneventful administration of .      Findings:      The aortic arch and the origin of the great vessels is unremarkable.    The bilateral common carotid arteries demonstrate no abnormality.    The bilateral carotid bulbs are within normal limits.    The bilateral cervical internal carotid arteries are unremarkable.    The bilateral cervical vertebral arteries are unremarkable.      Impression:        Unremarkable magnetic resonance angiography of the carotid circulation.              Electronically Signed: Anatolylima Wu    6/16/2023 7:31 PM EDT    Workstation ID: [id number]          reviewed    ECG/EMG Results (most recent)       Procedure Component Value Units Date/Time    ECG 12 Lead Syncope [[id number]] Collected: 06/16/23 1608     Updated: 06/17/23 1124     QT Interval 344 ms     Narrative:      HEART RATE= 99  bpm  RR Interval= 608  ms  CO Interval= 139  ms  P Horizontal Axis= -2  deg  P Front Axis= 36  deg  QRSD Interval= 70  ms  QT Interval= 344  ms  QRS Axis= 25  deg  T Wave Axis= 13  deg  - NORMAL ECG -  Sinus rhythm  When compared with ECG of 15-Jorge-2023 23:40:24,  No significant change  Electronically Signed By: Jsoe Adams ([place]) 17-Jun-2023 11:23:58  Date and Time of Study: 2023-06-16 16:08:05    Adult Transthoracic Echo Complete W/ Cont if Necessary Per Protocol [[id number]] Resulted: 06/17/23 1510     Updated:  06/17/23 1512     Target HR (85%) 165 bpm      Max. Pred. HR (100%) 194 bpm      EF(MOD-bp) 54.9 %      LVIDd 3.6 cm      LVIDs 2.5 cm      IVSd 0.90 cm      LVPWd 0.90 cm      FS 30.6 %      IVS/LVPW 1.00 cm      LV Sys Vol (BSA corrected) 15.9 cm2      EDV(cubed) 46.7 ml      LV Giraldo Vol (BSA corrected) 34.5 cm2      LVOT area 2.8 cm2      LV mass(C)d 92.8 grams      LVOT diam 1.90 cm      EDV(MOD-sp2) 88.6 ml      EDV(MOD-sp4) 82.9 ml      ESV(MOD-sp2) 42.5 ml      ESV(MOD-sp4) 38.2 ml      SV(MOD-sp2) 46.1 ml      SV(MOD-sp4) 44.7 ml      SI(MOD-sp2) 19.2 ml/m2      SI(MOD-sp4) 18.6 ml/m2      EF(MOD-sp2) 52.0 %      EF(MOD-sp4) 53.9 %      MV E max jason 87.0 cm/sec      MV A max jason 57.1 cm/sec      MV dec time 0.14 msec      MV E/A 1.52     Med Peak E' Jason 10.9 cm/sec      Lat Peak E' Jason 11.5 cm/sec      Avg E/e' ratio 7.77     SV(LVOT) 63.8 ml      SV(RVOT) 41.2 ml      Qp/Qs 0.65     RV Base 2.9 cm      RV Mid 2.40 cm      RV Length 6.5 cm      RV S' 8.3 cm/sec      LA dimension (2D)  2.7 cm      LV V1 max 100.0 cm/sec      LV V1 max PG 4.0 mmHg      LV V1 mean PG 2.00 mmHg      LV V1 VTI 22.5 cm      Ao pk jason 104.0 cm/sec      Ao max PG 4.3 mmHg      Ao mean PG 2.00 mmHg      Ao V2 VTI 21.6 cm      JN(I,D) 3.0 cm2      MV max PG 3.6 mmHg      MV mean PG 2.00 mmHg      MV V2 VTI 23.1 cm      MV P1/2t 43.8 msec      MVA(P1/2t) 5.0 cm2      MVA(VTI) 2.8 cm2      MV dec slope 650.0 cm/sec2      TR max jason 242.0 cm/sec      TR max PG 23.4 mmHg      RVOT diam 2.00 cm      RV V1 max PG 1.14 mmHg      RV V1 max 53.5 cm/sec      RV V1 VTI 13.1 cm      PA V2 max 73.3 cm/sec      Ao root diam 2.6 cm      ACS 1.40 cm      Sinus 2.9 cm      STJ 2.6 cm      RVSP(TR) 26 mmHg      RAP systole 3 mmHg      ESV(cubed) 15.6 ml     Narrative:        Left ventricular systolic function is normal. Calculated left   ventricular EF = 54.9% Left ventricular ejection fraction appears to be 51   - 55%.    Estimated right ventricular  systolic pressure from tricuspid   regurgitation is normal (<35 mmHg).            reviewed        Results for orders placed during the hospital encounter of 06/16/23    Adult Transthoracic Echo Complete W/ Cont if Necessary Per Protocol    Interpretation Summary    Left ventricular systolic function is normal. Calculated left ventricular EF = 54.9% Left ventricular ejection fraction appears to be 51 - 55%.    Estimated right ventricular systolic pressure from tricuspid regurgitation is normal (<35 mmHg).      Microbiology Results (last 10 days)       ** No results found for the last 240 hours. **            Assessment & Plan     Syncope and collapse       Syncope  -Serial troponin less than 6  -CT PE protocol showed no evidence of pulmonary embolism or other acute pathology  -CT of head showed no acute abnormality  -UA generally unremarkable  -Urine pregnancy test negative  -MRI brain showed no acute intracranial infarction or hemorrhage with no abnormal enhancement reported  -CTA of head and neck reported as unremarkable  -EKG showed sinus rhythm at 99 without obvious acute changes or ectopy with a QTc of 441 ms  -Cardiology consulted who ordered echocardiogram and recommended MCOT at discharge  -Neurology consulted who recommended avoidance of triptans, resuming Topamax and outpatient neurology consult if migraines remain difficult to treat  -No driving x3 months pending further cardiac evaluation    Hypertension  -Well controlled   BP Readings from Last 1 Encounters:   06/17/23 109/65   - Continue lisinopril and hydrochlorothiazide  - Monitor while admitted    GERD  -PPI    Depression/bipolar disorder  -Vraylar and fluoxetine    Morbid obesity (BMI: 49.55)  -Encourage diet lifestyle modifications    I discussed the patients findings and my recommendations with patient and nursing staff.     Discharge Diagnosis:      Syncope and collapse      Hospital Course  Patient is a 27 y.o. female presented with recurrent  syncope with an HPI noted above.  Serial troponins were assessed remain less than 6 with CT PE protocol showing no pulmonary embolism or acute pathology.  CT of head as well as MRI of brain and MRA of head and neck were generally unremarkable.  UA was obtained which was also unremarkable and urine pregnancy test was negative.  EKG showed sinus rhythm at 99 without obvious acute changes and she was continued on telemetry without significant events reported.  Cardiology evaluated patient who recommended echocardiogram which was performed on 6/17/2023 which showed normal left ventricular systolic function with an EF of 54.9%.  Neurology was also consulted who recommended restarting and escalating Topamax which will be prescribed at discharge and no driving for 3 months with follow-up with outpatient neurology if migraines remain difficult to control.  MCOT was also recommended and will be ordered at discharge.  At this time patient is felt to be in good condition for discharge with close follow-up with her PCP as well as cardiology on an outpatient basis.  Her full testing/results and plan were discussed with patient and with concerning/alarm symptoms for which to call 911/return to the ED.  All questions were answered and she verbalizes her understanding and agreement.    Past Medical History:     Past Medical History:   Diagnosis Date    Anxiety     Asthma     Bipolar affective disorder in full remission     Depression     Depression     Fatty liver disease, nonalcoholic     Hernia of abdominal cavity     Hypertension     Menses, irregular        Past Surgical History:     Past Surgical History:   Procedure Laterality Date    ADENOIDECTOMY      DENTAL PROCEDURE      EAR TUBES      HYSTEROSCOPY      TONSILLECTOMY         Social History:   Social History     Socioeconomic History    Marital status:    Tobacco Use    Smoking status: Never     Passive exposure: Never    Smokeless tobacco: Never   Vaping Use     Vaping Use: Never used   Substance and Sexual Activity    Alcohol use: Yes     Alcohol/week: 1.0 standard drink     Types: 1 Glasses of wine per week     Comment: occ    Drug use: Never    Sexual activity: Yes     Partners: Male       Procedures Performed         Consults:   Consults       Date and Time Order Name Status Description    6/16/2023  9:45 PM Inpatient Neurology Consult General      6/16/2023  9:45 PM Inpatient Cardiology Consult Completed             Condition on Discharge:     Stable    Discharge Disposition      Discharge Medications     Discharge Medications        New Medications        Instructions Start Date   topiramate 25 MG tablet  Commonly known as: Topamax   Take 1 tablet nightly for 7 days then 1 tablet in the morning and 1 tablet at night for 7 days             Continue These Medications        Instructions Start Date   Cariprazine HCl 3 MG capsule capsule  Commonly known as: VRAYLAR   1 mg, Oral, Daily      FLUoxetine HCl (PMDD) 20 MG tablet   Oral      hydroCHLOROthiazide 12.5 MG tablet  Commonly known as: HYDRODIURIL   25 mg, Oral, Daily      lisinopril 5 MG tablet  Commonly known as: PRINIVIL,ZESTRIL   20 mg, Oral, Daily      pantoprazole 20 MG EC tablet  Commonly known as: PROTONIX   40 mg, Oral, Daily               Discharge Diet:     Activity at Discharge:     Follow-up Appointments  No future appointments.  Additional Instructions for the Follow-ups that You Need to Schedule       Discharge Follow-up with PCP   As directed       Currently Documented PCP:    Deepti Mejía PA    PCP Phone Number:    [phone]     Follow Up Details: 5 to 7 days         Discharge Follow-up with Specified Provider: Cardiology; 1 Month   As directed      To: Cardiology    Follow Up: 1 Month                 Test Results Pending at Discharge       Risk for Readmission (LACE) Score: 2 (6/17/2023  6:00 AM)      Greater than 30 minutes spent in discharge activities for this patient    Billy Virgen  JOSÉ LUIS  06/17/23  16:57 EDT

## 2023-06-17 NOTE — PLAN OF CARE
Goal Outcome Evaluation:  Plan of Care Reviewed With: patient        Progress: no change  Outcome Evaluation: pt slept well this shift, still c/o pain in head and dizziness intermittenly. npo at midnight, pt has order for cardiology and neurology consults this am to be called.

## 2023-06-17 NOTE — CONSULTS
Cardiology [place]        Subjective:     Encounter Date:06/16/2023      Patient ID: Jazmine Read is a 27 y.o. female.        Cardiology assessment and plan      Syncope and loss of consciousness  Migraine headaches  Hypertension  Obesity  Normal MRI of the brain normal MR angiogram of the brain  Normal echocardiogram  Twelve-lead EKG with normal sinus rhythm with no acute ST changes normal QT interval  Tmax is 97.8 pulse is 86 respirations are 16 blood pressure is 109/65 sats are 95% on room air  Normal troponin  Sodium is 139 potassium is 4.4 creatinine is 0.5 hemoglobin is 12.6  Need for close monitoring and follow-up reviewed and discussed with patient  Schedule for extended Holter monitor  Close monitoring of blood pressure and heart rate at home  Based on the results of the outpatient rating of blood pressure we may need to adjust the medications  Need for close monitoring and follow-up reviewed and discussed with patient  Further recommendations based on patient course            Chief Complaint: syncope    Referring Physician: Jose Adams    HPI:  Jazmine Read is a 27 y.o. female who presents with syncope. Ms. Read does not routinely see a cardiologist. Pmh includes anxiety, asthma, obesity, bipolar disorder, depression, fatty liver disease, HTN.    Ms. Dahl present with syncope that occurred first on Thursday while walking around [employer] with her significant other. She had been experiencing a headache. She states she has migraines but this headache was different. She got dizzy and passed out for a few seconds.  denied seizure like activity. She denies chest pain or shortness of breath. She was seen in ER and subsequently discharged home.   She reports Friday episode occurred again while at work.     No acute findings on CT of head, MRI. She underwent CT PE which was normal.  Troponin normal. WBC mildly elevated on initial encounter 6/16/2023.   Cardiology has been consulted for  syncope.       Past Medical History:   Diagnosis Date    Anxiety     Asthma     Bipolar affective disorder in full remission     Depression     Depression     Fatty liver disease, nonalcoholic     Hernia of abdominal cavity     Hypertension     Menses, irregular        Past Surgical History:   Procedure Laterality Date    ADENOIDECTOMY      DENTAL PROCEDURE      EAR TUBES      HYSTEROSCOPY      TONSILLECTOMY         History reviewed. No pertinent family history.    Social History     Socioeconomic History    Marital status:    Tobacco Use    Smoking status: Never     Passive exposure: Never    Smokeless tobacco: Never   Vaping Use    Vaping Use: Never used   Substance and Sexual Activity    Alcohol use: Yes     Alcohol/week: 1.0 standard drink     Types: 1 Glasses of wine per week     Comment: occ    Drug use: Never    Sexual activity: Yes     Partners: Male         Allergies   Allergen Reactions    Amoxicillin Unknown - High Severity    Penicillins Hives       Current Medications:   Scheduled Meds:Cariprazine HCl, 3 mg, Oral, Daily  enoxaparin, 40 mg, Subcutaneous, Q12H  hydroCHLOROthiazide, 25 mg, Oral, Daily  lisinopril, 20 mg, Oral, Daily  pantoprazole, 40 mg, Oral, Daily  senna-docusate sodium, 2 tablet, Oral, BID  sodium chloride, 10 mL, Intravenous, Q12H      Continuous Infusions:Pharmacy to Dose enoxaparin (LOVENOX),   sodium chloride, 100 mL/hr, Last Rate: 100 mL/hr (06/17/23 0932)        Review of Systems   Constitutional: Negative for chills, diaphoresis and malaise/fatigue.   HENT:  Negative for nosebleeds.    Cardiovascular:  Positive for syncope. Negative for chest pain, dyspnea on exertion, irregular heartbeat, leg swelling, near-syncope, orthopnea, palpitations and paroxysmal nocturnal dyspnea.   Respiratory:  Negative for cough, shortness of breath, sleep disturbances due to breathing and sputum production.    Musculoskeletal:  Negative for back pain and joint pain.   Gastrointestinal:   "Negative for change in bowel habit, hematemesis and hematochezia.   Genitourinary:  Negative for hematuria and urgency.   Neurological:  Positive for dizziness and light-headedness. Negative for headaches.   Psychiatric/Behavioral:  Negative for altered mental status and memory loss.           Objective:         /74 (BP Location: Left arm, Patient Position: Lying)   Pulse 82   Temp 98 °F (36.7 °C) (Oral)   Resp 17   Ht 167.6 cm (66\")   Wt (!) 139 kg (307 lb)   LMP 05/15/2023 (Approximate)   SpO2 97%   BMI 49.55 kg/m²     Physical Exam:  General Appearance:    Alert, cooperative, in no acute distress                                Head: Atraumatic, normocephalic, PERRLA               Neck:   supple, trachea midline, no thyromegaly, no carotid bruit, no JVD   Lungs:     Clear to auscultation,respirations regular, even and               unlabored    Heart:    Regular rhythm and normal rate, normal S1 and S2, no       murmur, no gallop, no rub, no click   Abdomen:     Normal bowel sounds, no masses, no organomegaly, soft  nontender, nondistended, no guarding, no rebound  tenderness   Extremities:   Moves all extremities well, no edema, no cyanosis, no  redness   Pulses:   Pulses palpable and equal bilaterally   Skin:   No bleeding, bruising or rash   Neurologic:   Awake, alert, oriented x3                 ASCVD Risk Score::  The ASCVD Risk score (Wayne DK, et al., 2019) failed to calculate for the following reasons:    The 2019 ASCVD risk score is only valid for ages 40 to 79      Lab Review:     Results from last 7 days   Lab Units 06/17/23  0939 06/16/23  1604 06/16/23  0106   SODIUM mmol/L 139 139 139   POTASSIUM mmol/L 4.4 4.1 3.9   CHLORIDE mmol/L 105 102 101   CO2 mmol/L 24.0 24.0 24.0   BUN mg/dL 15 10 10   CREATININE mg/dL 0.59 0.57 0.64   GLUCOSE mg/dL 105* 115* 124*   CALCIUM mg/dL 8.8 9.4 9.2   AST (SGOT) U/L  --  26 11   ALT (SGPT) U/L  --  25 16     Results from last 7 days   Lab Units " 06/17/23  0939 06/16/23  1604 06/16/23  0106   HSTROP T ng/L <6 <6 <6     Results from last 7 days   Lab Units 06/17/23  0939 06/16/23  1604   WBC 10*3/mm3 7.10 10.80   HEMOGLOBIN g/dL 12.6 13.7   HEMATOCRIT % 39.6 42.2   PLATELETS 10*3/mm3 391 433         Results from last 7 days   Lab Units 06/16/23  0106   MAGNESIUM mg/dL 2.0           Invalid input(s): LDLCALC      Results from last 7 days   Lab Units 06/16/23  0106   TSH uIU/mL 1.890       Recent Radiology:  Imaging Results (Most Recent)       Procedure Component Value Units Date/Time    MRI Brain With & Without Contrast [[id number]] Collected: 06/16/23 1934     Updated: 06/16/23 1941    Narrative:      MRI BRAIN W WO CONTRAST    Date of Exam: 6/16/2023 7:10 AM EDT    Indication: headache syncope.     Comparison: None available.    Technique:  Routine multiplanar/multisequence sequence images of the brain were obtained before and after the uneventful administration of contrast.      Findings:      *No acute intracranial infarction.  *No acute intracranial hemorrhage.  *No masses, mass effect, midline shift or hydrocephalus.  *Pituitary gland, brainstem, and craniocervical junction are unremarkable.  *Main intracranial flow-voids are patent.  *Orbits and globes are unremarkable.    There is no abnormal enhancement.      *        Impression:      1.No acute intracranial infarction or hemorrhage.    2.No abnormal enhancement.              Electronically Signed: Anatoly Wu    6/16/2023 7:39 PM EDT    Workstation ID: [id number]    MRI Angiogram Head Without Contrast [[id number]] Collected: 06/16/23 1924     Updated: 06/16/23 1933    Narrative:      MRI ANGIOGRAM HEAD WO CONTRAST    Date of Exam: 6/16/2023 6:25 PM EDT    Indication: headache syncope.     Comparison: None available.    Technique:  Routine 3-D time-of-flight gradient echo imaging was obtained of the head without contrast administration.      Findings:      The bilateral petrous, clinoid and  supraclinoid internal carotid arteries are unremarkable the bilateral anterior cerebral arteries demonstrate no abnormality.    The bilateral middle cerebral arteries demonstrate no abnormality.    The V4 segments of the vertebral arteries, the vertebrobasilar junction and basilar artery are unremarkable.    The bilateral posterior cerebral arteries are unremarkable.      Impression:        No evidence of vascular occlusion. No high-grade stenosis. No evidence of aneurysm.    Grossly unremarkable exam                Electronically Signed: Anatoly Wu    6/16/2023 7:31 PM EDT    Workstation ID: [id number]    MRI Angiogram Neck With & Without Contrast [[id number]] Collected: 06/16/23 1926     Updated: 06/16/23 1933    Narrative:      MRI ANGIOGRAM NECK W WO CONTRAST    Date of Exam: 6/16/2023 6:31 PM EDT    Indication: headache syncope.     Comparison: None available.    Technique:  Routine 3-D time-of-flight gradient echo imaging was obtained of the neck before and after the uneventful administration of .      Findings:      The aortic arch and the origin of the great vessels is unremarkable.    The bilateral common carotid arteries demonstrate no abnormality.    The bilateral carotid bulbs are within normal limits.    The bilateral cervical internal carotid arteries are unremarkable.    The bilateral cervical vertebral arteries are unremarkable.      Impression:        Unremarkable magnetic resonance angiography of the carotid circulation.              Electronically Signed: Anatoly Wu    6/16/2023 7:31 PM EDT    Workstation ID: [id number]              ECHOCARDIOGRAM:              Assessment:         Active Hospital Problems    Diagnosis  POA    **Syncope and collapse [R55]  Unknown     Syncope / headache    2. HTN    3. Obesity    4. Bipolar / depression / anxiety    5. Asthma    6. H/o migraine headaches     Plan:   Check orthostatics  Check 2D ECHO  Plan for monitor at discharge               Charlotte Atwood  APRN  06/17/23  10:36 EDT

## 2023-06-17 NOTE — NURSING NOTE
Patient was waiting for Respiratory therapist to come to observation unit before being transported out to be educated on an out-patient cardiac monitor. At 1745 patient was getting dressed and while sitting in bed she loss consciousness and her significant other caught her before she hit the floor.   Vitals were taken STAT, charge nurse was informed, as well as ED physician.

## 2023-06-17 NOTE — CONSULTS
Reason for Consultation: Syncope and headache    Subjective .     History of present illness:  26 yo female with hx of episodic migraine, taking excedrin migraine for abortive and recently prescribed topamax by her pcm, which she took for about a month, then ran out of medication. She presents to the [facility] ER for new syncope, which occurred Thursday and Friday, in setting of headache. Usually she does not have presyncopal or LOC symptoms with her headaches. She was speaking a little slower than usual, per her boyfriend, after he witnessed the event on Thursday at [facility], but she was not altered. No witnessed convulsions, no incontinence, no tongue biting. She has a hx of febrile seizure x1 as at around age 14. No family hx of epilepsy. States she felt dizzy/lightheaded prior to the events.     Review of Systems  A comprehensive neurological review of systems was performed. Unless otherwise documented, patient denies headache, confusion, vision changes, vertigo, lightheadedness, numbness/tingling, focal weakness in face or body, loss of consciousness, history of stroke or seizure, recent illness.     Hospital problem list, generated from chart    Syncope and collapse      History  Past Medical History:   Diagnosis Date    Anxiety     Asthma     Bipolar affective disorder in full remission     Depression     Depression     Fatty liver disease, nonalcoholic     Hernia of abdominal cavity     Hypertension     Menses, irregular    ,   Past Surgical History:   Procedure Laterality Date    ADENOIDECTOMY      DENTAL PROCEDURE      EAR TUBES      HYSTEROSCOPY      TONSILLECTOMY     , History reviewed. No pertinent family history.,   Social History     Tobacco Use    Smoking status: Never     Passive exposure: Never    Smokeless tobacco: Never   Vaping Use    Vaping Use: Never used   Substance Use Topics    Alcohol use: Yes     Alcohol/week: 1.0 standard drink     Types: 1 Glasses of wine per week     Comment: occ     Drug use: Never   ,   Medications Prior to Admission   Medication Sig Dispense Refill Last Dose    Cariprazine HCl (VRAYLAR) 3 MG capsule capsule Take 1 mg by mouth Daily.   Past Week    FLUoxetine HCl, PMDD, 20 MG tablet Take  by mouth.   Past Week    hydroCHLOROthiazide (HYDRODIURIL) 12.5 MG tablet Take 2 tablets by mouth Daily.   Past Week    lisinopril (PRINIVIL,ZESTRIL) 5 MG tablet Take 4 tablets by mouth Daily.   Past Week    pantoprazole (PROTONIX) 20 MG EC tablet Take 2 tablets by mouth Daily.   Past Week   , Scheduled Meds:  Cariprazine HCl, 3 mg, Oral, Daily  enoxaparin, 40 mg, Subcutaneous, Q12H  hydroCHLOROthiazide, 25 mg, Oral, Daily  lisinopril, 20 mg, Oral, Daily  pantoprazole, 40 mg, Oral, Daily  senna-docusate sodium, 2 tablet, Oral, BID  sodium chloride, 10 mL, Intravenous, Q12H    , Continuous Infusions:  Pharmacy to Dose enoxaparin (LOVENOX),   sodium chloride, 100 mL/hr, Last Rate: 100 mL/hr (06/17/23 0932)    , PRN Meds:    acetaminophen    senna-docusate sodium **AND** polyethylene glycol **AND** bisacodyl **AND** bisacodyl    melatonin    nitroglycerin    ondansetron    Pharmacy to Dose enoxaparin (LOVENOX)    [COMPLETED] Insert Peripheral IV **AND** sodium chloride    sodium chloride    sodium chloride and Allergies:  Amoxicillin and Penicillins    Objective     Vital Signs   Temp:  [97.8 °F (36.6 °C)-98.3 °F (36.8 °C)] 98 °F (36.7 °C)  Heart Rate:  [] 82  Resp:  [16-17] 17  BP: (105-138)/() 105/63    Intake & Output (last day)         06/16 0701  06/17 0700 06/17 0701  06/18 0700    P.O.  0    I.V. (mL/kg) 621.7 (4.4)     Total Intake(mL/kg) 621.7 (4.4) 0 (0)    Net +621.7 0                   Physical Exam:     Mental status: patient alert and fully oriented.   Cranial nerves: pupils equal, round, reactive to light and accomodation; visual fields full to confrontation; EOMI; no ptosis or facial droop; facial sensation and strength intact throughout; hearing intact to voice;  no nystagmus; palate elevates symmetrically; trapezius 5/5 bilaterally; tongue midline on protrusion and AROM intact. No dysarthria.   Motor: No drift. 5/5 distally and proximally in all limbs. Tone is normal. No focal atrophy appreciated.  Reflexes: 2-/4 at bicep/triceps/brachioradialis/patella/Achilles bilaterally; Plantar reflexes are flexion. No Calvert's. No ankle clonus.   Sensation: Intact to light touch distally in all limbs.   Gait/Coordination: FTN, finger tapping, and HTS all normal bilaterally. No tremor. Gait deferred. Romberg negative.  Other: N/a      Results Review:   I reviewed the patient's new clinical results.    Lab Results (last 24 hours)       Procedure Component Value Units Date/Time    Basic Metabolic Panel [[id number]]  (Abnormal) Collected: 06/17/23 0939    Specimen: Blood from Hand, Left Updated: 06/17/23 1018     Glucose 105 mg/dL      BUN 15 mg/dL      Creatinine 0.59 mg/dL      Sodium 139 mmol/L      Potassium 4.4 mmol/L      Chloride 105 mmol/L      CO2 24.0 mmol/L      Calcium 8.8 mg/dL      BUN/Creatinine Ratio 25.4     Anion Gap 10.0 mmol/L      eGFR 127.7 mL/min/1.73     Narrative:      GFR Normal >60  Chronic Kidney Disease <60  Kidney Failure <15      High Sensitivity Troponin T [[id number]]  (Normal) Collected: 06/17/23 0939    Specimen: Blood from Hand, Left Updated: 06/17/23 1017     HS Troponin T <6 ng/L     Narrative:      High Sensitive Troponin T Reference Range:  <10.0 ng/L- Negative Female for AMI  <15.0 ng/L- Negative Male for AMI  >=10 - Abnormal Female indicating possible myocardial injury.  >=15 - Abnormal Male indicating possible myocardial injury.   Clinicians would have to utilize clinical acumen, EKG, Troponin, and serial changes to determine if it is an Acute Myocardial Infarction or myocardial injury due to an underlying chronic condition.         CBC Auto Differential [[id number]]  (Abnormal) Collected: 06/17/23 0939    Specimen: Blood from Hand, Left Updated:  06/17/23 0949     WBC 7.10 10*3/mm3      RBC 4.89 10*6/mm3      Hemoglobin 12.6 g/dL      Hematocrit 39.6 %      MCV 81.0 fL      MCH 25.8 pg      MCHC 31.9 g/dL      RDW 15.1 %      RDW-SD 42.0 fl      MPV 7.9 fL      Platelets 391 10*3/mm3      Neutrophil % 63.7 %      Lymphocyte % 28.0 %      Monocyte % 6.1 %      Eosinophil % 1.7 %      Basophil % 0.5 %      Neutrophils, Absolute 4.50 10*3/mm3      Lymphocytes, Absolute 2.00 10*3/mm3      Monocytes, Absolute 0.40 10*3/mm3      Eosinophils, Absolute 0.10 10*3/mm3      Basophils, Absolute 0.00 10*3/mm3      nRBC 0.1 /100 WBC     Single High Sensitivity Troponin T [[id number]]  (Normal) Collected: 06/16/23 1604    Specimen: Blood from Arm, Right Updated: 06/16/23 1644     HS Troponin T <6 ng/L     Narrative:      High Sensitive Troponin T Reference Range:  <10.0 ng/L- Negative Female for AMI  <15.0 ng/L- Negative Male for AMI  >=10 - Abnormal Female indicating possible myocardial injury.  >=15 - Abnormal Male indicating possible myocardial injury.   Clinicians would have to utilize clinical acumen, EKG, Troponin, and serial changes to determine if it is an Acute Myocardial Infarction or myocardial injury due to an underlying chronic condition.         Comprehensive Metabolic Panel [[id number]]  (Abnormal) Collected: 06/16/23 1604    Specimen: Blood from Arm, Right Updated: 06/16/23 1640     Glucose 115 mg/dL      BUN 10 mg/dL      Creatinine 0.57 mg/dL      Sodium 139 mmol/L      Potassium 4.1 mmol/L      Chloride 102 mmol/L      CO2 24.0 mmol/L      Calcium 9.4 mg/dL      Total Protein 7.7 g/dL      Albumin 4.4 g/dL      ALT (SGPT) 25 U/L      AST (SGOT) 26 U/L      Alkaline Phosphatase 59 U/L      Total Bilirubin 0.3 mg/dL      Globulin 3.3 gm/dL      A/G Ratio 1.3 g/dL      BUN/Creatinine Ratio 17.5     Anion Gap 13.0 mmol/L      eGFR 128.7 mL/min/1.73     Narrative:      GFR Normal >60  Chronic Kidney Disease <60  Kidney Failure <15      CBC & Differential  [[id number]]  (Abnormal) Collected: 06/16/23 1604    Specimen: Blood from Arm, Right Updated: 06/16/23 1612    Narrative:      The following orders were created for panel order CBC & Differential.  Procedure                               Abnormality         Status                     ---------                               -----------         ------                     CBC Auto Differential[[id number]]        Abnormal            Final result                 Please view results for these tests on the individual orders.    CBC Auto Differential [[id number]]  (Abnormal) Collected: 06/16/23 1604    Specimen: Blood from Arm, Right Updated: 06/16/23 1612     WBC 10.80 10*3/mm3      RBC 5.37 10*6/mm3      Hemoglobin 13.7 g/dL      Hematocrit 42.2 %      MCV 78.6 fL      MCH 25.6 pg      MCHC 32.6 g/dL      RDW 14.9 %      RDW-SD 43.3 fl      MPV 8.1 fL      Platelets 433 10*3/mm3      Neutrophil % 76.1 %      Lymphocyte % 17.9 %      Monocyte % 4.3 %      Eosinophil % 0.8 %      Basophil % 0.9 %      Neutrophils, Absolute 8.20 10*3/mm3      Lymphocytes, Absolute 1.90 10*3/mm3      Monocytes, Absolute 0.50 10*3/mm3      Eosinophils, Absolute 0.10 10*3/mm3      Basophils, Absolute 0.10 10*3/mm3      nRBC 0.1 /100 WBC            Imaging Results (Last 24 Hours)       Procedure Component Value Units Date/Time    MRI Brain With & Without Contrast [[id number]] Collected: 06/16/23 1934     Updated: 06/16/23 1941    Narrative:      MRI BRAIN W WO CONTRAST    Date of Exam: 6/16/2023 7:10 AM EDT    Indication: headache syncope.     Comparison: None available.    Technique:  Routine multiplanar/multisequence sequence images of the brain were obtained before and after the uneventful administration of contrast.      Findings:      *No acute intracranial infarction.  *No acute intracranial hemorrhage.  *No masses, mass effect, midline shift or hydrocephalus.  *Pituitary gland, brainstem, and craniocervical junction are unremarkable.  *Main  intracranial flow-voids are patent.  *Orbits and globes are unremarkable.    There is no abnormal enhancement.      *        Impression:      1.No acute intracranial infarction or hemorrhage.    2.No abnormal enhancement.              Electronically Signed: Anatoly Wu    6/16/2023 7:39 PM EDT    Workstation ID: [id number]    MRI Angiogram Head Without Contrast [[id number]] Collected: 06/16/23 1924     Updated: 06/16/23 1933    Narrative:      MRI ANGIOGRAM HEAD WO CONTRAST    Date of Exam: 6/16/2023 6:25 PM EDT    Indication: headache syncope.     Comparison: None available.    Technique:  Routine 3-D time-of-flight gradient echo imaging was obtained of the head without contrast administration.      Findings:      The bilateral petrous, clinoid and supraclinoid internal carotid arteries are unremarkable the bilateral anterior cerebral arteries demonstrate no abnormality.    The bilateral middle cerebral arteries demonstrate no abnormality.    The V4 segments of the vertebral arteries, the vertebrobasilar junction and basilar artery are unremarkable.    The bilateral posterior cerebral arteries are unremarkable.      Impression:        No evidence of vascular occlusion. No high-grade stenosis. No evidence of aneurysm.    Grossly unremarkable exam                Electronically Signed: Anatoly Wu    6/16/2023 7:31 PM EDT    Workstation ID: [id number]    MRI Angiogram Neck With & Without Contrast [[id number]] Collected: 06/16/23 1926     Updated: 06/16/23 1933    Narrative:      MRI ANGIOGRAM NECK W WO CONTRAST    Date of Exam: 6/16/2023 6:31 PM EDT    Indication: headache syncope.     Comparison: None available.    Technique:  Routine 3-D time-of-flight gradient echo imaging was obtained of the neck before and after the uneventful administration of .      Findings:      The aortic arch and the origin of the great vessels is unremarkable.    The bilateral common carotid arteries demonstrate no abnormality.    The  bilateral carotid bulbs are within normal limits.    The bilateral cervical internal carotid arteries are unremarkable.    The bilateral cervical vertebral arteries are unremarkable.      Impression:        Unremarkable magnetic resonance angiography of the carotid circulation.              Electronically Signed: Anatoly Wu    6/16/2023 7:31 PM EDT    Workstation ID: [id number]               Assessment & Plan         Syncope  Migraine, episodic     28 yo with hx of episodic migraine with recent headaches and new syncope x2 this week. No postictal confusion, no witnessed convulsions, and no incontinence. Remote history of one-time febrile seizure as a preteen does not typically confer long-term risk of epilepsy. Agree with cardiac workup, especially since MRI/MRA and examination all normal. Very low suspicion for epileptic etiology. Imaging personally reviewed, agree with interpretation.     OK to restart topiramate from my perspective, 25mg QHS x1 week, then add 25mg QAM, and plan to increase by 25mg weekly to goal 50mg BID. Patient has follow up booked with her PCM on Tuesday of next week. Could consider outpatient neurology consultation if migraines are difficult to treat going forward. Would avoid triptans going forward either way, due to theoretical vasoconstrictive effects, at least pending cardiology workup.     OK to treat headache symptomatically with headache cocktails, analgesics. Consider steroid burst (medrol dosepak, if refractory).    No driving for 3 months for unexplained LOC per [place] state law, pending cardiology workup and clearance.     I discussed the patient's findings and my recommendations with patient and family    Medical Decision Making for this neurology consultation consists of the following:  Review of previous chart, including H/P, provider and nursing notes as applicable.  Review of medications and vitals.  Review of previous labs, neuroimaging, and additional relevant  diagnostics, as applicable.   Interpretation of laboratory, imaging, and other diagnostic results, as applicable.   Total face-to-face/floor time: 60 minutes.       This note contains portions which were generated via Dragon dictation software (voice to written text).    Albert Varma DO  06/17/23  13:20 EDT

## 2023-06-17 NOTE — PLAN OF CARE
Goal Outcome Evaluation:  Plan of Care Reviewed With: patient, significant other        Progress: improving

## 2023-06-17 NOTE — SIGNIFICANT NOTE
Post Fall Note    Patient: Jazmine Read   Location: 101/1    Time of fall: 1802    Date of fall: 6-17-23    Location of the fall: OBS room 101    Describe the Fall: Patient's significant other reported that the patient was sitting on the edge of the bed and that she was talking to him and she just passed out. Significant other caught the patient as she was on her way to the floor, patient lost consciousness but did not hit her head, patient did hit her left knee. Patients significant other stated that he felt that the patient was out for about 1 minute it seemed. Head to toe assessment completed. Patient reports intermittent right sided headache. Patient states that this was happening prior to arriving to the hospital.  Patients blood pressure 120/70  oxygen level 93% on room air., temp 98.6. Contacted pharmacy. Spoke with Dr. Will in ED. Per Dr. Will, delay discharge until 2200, monitor patient x4 hours, get a 4view xray of patients left knee, place MCOT and see if they can download what is on the MCOT. RN informed Dr. Will that the MCOT was placed after the episode. RN informed patient of the plan of care, patient and significant other verbalizes understanding.     Interventions in place prior to fall: Clutter free environment, call light within reach. Not in fall precautions prior to her fall.     Was the fall witnessed? Yes by significant other.  Where was the patient found?: Sitting on the side of the bed.  Patient position when found?: Sitting upright on the side of the bed.    If witnessed, what part of the body made contact with the floor or other object?Left knee    Injury: No visible injuries. Patient reports that she fell on her left knee. Patient reports soreness. Xray ordered.  Is the patient having any pain?: Soreness to left knee.  Level of Consciousness: Patient awake, alert and oriented. Patient did appear drowsy.  Post fall assessment:Patient alert and drowsy post fall. Vital signs  stable.  Physician notified: Yes, spoke with Dr. Will in ER.    Name of physician:Dr. Will     Family notified:Yes, significant other at bedside   Name of family member notified:Shiraz Aguilerains   Post fall precautions in place: 2 side rails up on bed, clutter free environment, call light within reach.  Teaching provided: Instructed patient to make slow position changes. Instructed patient to use call light and ask for assistance prior to getting out of bed. Patient and significant other verbalizes understanding.       Electronically signed by Nga Petit RN, 06/17/23, 19:51 EDT.

## 2023-06-17 NOTE — PLAN OF CARE
Goal Outcome Evaluation:  Plan of Care Reviewed With: patient, significant other        Neurology and cardiology both have met with patient and have given the permission for patient to be discharged per PCP.

## 2023-06-26 ENCOUNTER — TELEPHONE (OUTPATIENT)
Dept: CARDIOLOGY | Facility: CLINIC | Age: 28
End: 2023-06-26

## 2023-06-26 NOTE — TELEPHONE ENCOUNTER
Caller: Jazmine Read    Relationship to patient: Self    Best call back number: [phone]    New or established patient?  [] New  [x] Established    Date of discharge: 06.17.23    Facility discharged from: [facility]    Diagnosis/Symptoms: SYNCOPE    Length of stay (If applicable): 1 DAY    Specialty Only: Did you see a [facility] provider?    [x] Yes  [] No  If so, who? DR. MIRANDA    PT IS CALLING TO SCHEDULE A HOSPITAL FOLLOW UP WITH DR. MIRANDA FOR 1 MONTH. SHE SAID THAT DR. MIRANDA WOULD LIKE TO SEE HER SOONER IF SHE PASSED OUT AGAIN, AND SHE SAID SHE PASSED OUT LAST THURSDAY 6.22.23. PT WOULD LIKE TO BE SEEN IN [place] OFFICE IF POSSIBLE, IF NOT SHE SAID SHE CAN COME TO [place]. PLEASE ADVISE ON SCHEDULING

## 2023-06-30 ENCOUNTER — TELEPHONE (OUTPATIENT)
Dept: CARDIOLOGY | Facility: CLINIC | Age: 28
End: 2023-06-30

## 2023-06-30 PROBLEM — R47.01 APHASIA: Status: ACTIVE | Noted: 2023-06-30

## 2023-06-30 NOTE — TELEPHONE ENCOUNTER
Caller: Jazmine Read    Relationship: Self    Best call back number: [phone]    What is the best time to reach you: ANYTIME    What was the call regarding:PATIENT IS CALLING TO REPORT THAT SHE HAD DIZZINESS AND HEADACHES AND WENT TO ER AND WAS RELEASED FOR THE HOSPITAL LAST NIGHT AND THAT HER BLOOD PRESSURE IS REALLY LOW. PLEASE REACH OUT TO PATIENT FOR CONSULTATION.    Is it okay if the provider responds through MyChart: YES

## 2023-07-03 ENCOUNTER — TELEPHONE (OUTPATIENT)
Dept: CARDIOLOGY | Facility: CLINIC | Age: 28
End: 2023-07-03

## 2023-07-03 NOTE — TELEPHONE ENCOUNTER
Caller: Jazmine Read    Relationship: Self    Best call back number: [phone]    What form or medical record are you requesting: SHORT TERM DISABILITY PAPERWORK    Who is requesting this form or medical record from you: [employer]    How would you like to receive the form or medical records (pick-up, mail, fax): FAX     If fax, what is the fax number: [phone]   Timeframe paperwork needed: ASAP    Additional notes: PLEASE REACH OUT TO PATIENT FOR FURTHER DISCUSSION.

## 2023-07-15 PROBLEM — R51.9 NONINTRACTABLE HEADACHE: Status: ACTIVE | Noted: 2023-07-15

## 2023-07-28 ENCOUNTER — OFFICE VISIT (OUTPATIENT)
Dept: CARDIOLOGY | Facility: CLINIC | Age: 28
End: 2023-07-28
Payer: MEDICAID

## 2023-07-28 VITALS
DIASTOLIC BLOOD PRESSURE: 92 MMHG | WEIGHT: 293 LBS | HEART RATE: 78 BPM | HEIGHT: 66 IN | SYSTOLIC BLOOD PRESSURE: 138 MMHG | OXYGEN SATURATION: 97 % | BODY MASS INDEX: 47.09 KG/M2

## 2023-07-28 DIAGNOSIS — R55 SYNCOPE AND COLLAPSE: ICD-10-CM

## 2023-07-28 DIAGNOSIS — R00.0 INAPPROPRIATE SINUS TACHYCARDIA: Primary | ICD-10-CM

## 2023-07-28 PROBLEM — I47.11 INAPPROPRIATE SINUS TACHYCARDIA: Status: ACTIVE | Noted: 2023-07-28

## 2023-07-28 RX ORDER — PANTOPRAZOLE SODIUM 40 MG/1
1 TABLET, DELAYED RELEASE ORAL DAILY
COMMUNITY

## 2023-07-28 RX ORDER — LISINOPRIL 20 MG/1
1 TABLET ORAL DAILY
COMMUNITY
End: 2023-07-28

## 2023-07-28 RX ORDER — ATENOLOL 25 MG/1
25 TABLET ORAL DAILY
Qty: 30 TABLET | Refills: 11 | Status: SHIPPED | OUTPATIENT
Start: 2023-07-28

## 2023-07-28 RX ORDER — FLUOXETINE 20 MG/1
TABLET, FILM COATED ORAL
COMMUNITY
Start: 2023-07-02

## 2023-07-28 RX ORDER — LORATADINE 10 MG/1
CAPSULE, LIQUID FILLED ORAL
COMMUNITY

## 2023-07-28 RX ORDER — HYDROCHLOROTHIAZIDE 25 MG/1
1 TABLET ORAL DAILY
COMMUNITY
End: 2023-07-28

## 2023-07-28 NOTE — PROGRESS NOTES
Cardiology Office Follow Up Visit      Primary Care Provider:  Deepti Mejía PA    Reason for f/u:     Hospital Follow-Up Syncope      Subjective       History of Present Illness       Jazmine Read is a 27 y.o. female seen in clinic today for hospital follow-up.    Patient has no prior history of ischemic heart disease.  She was hospitalized 6/17/23 recently for syncope.  2D echo 6/2023 showed an EF = 51-55% with trace TR. She was discharged home with an ambulatory heart monitor.  She had several more syncopal episodes while wearing it, which only showed sinus tachycardia.  In office orthostatic v/s were negative.  She was planned for outpatient tilt table test and outpatient neurology evaluation.  PMH includes HTN, asthma, migraine, obesity, and anxiety.    She was hospitalized again 7/2023 for recurrent syncope.  She was seen by neurology and MRI brain was normal, EEG was normal, and her Topamax was increased.  She underwent outpatient tilt table test in which patient is reported with early syncope without hypotension or bradycardia.  She demonstrated sinus tachycardia throughout testing.  She was started on atenolol.      Patient still reports intermittent dizziness and headaches but states she has not had a syncopal episode in over a week.  She has been keeping a blood pressure diary and she has not had any hypotension since starting the new medication.  Overall she reports feeling better.      ASSESSMENT/PLAN:      Diagnoses and all orders for this visit:    1. Inappropriate sinus tachycardia (Primary)    2. Syncope and collapse            MEDICAL DECISION MAKING:      Patient has had no further syncope since starting atenolol and blood pressure is tolerating.  HR = 78 today.  She is still planned to follow-up with neurology as an outpatient.        RTC: Will keep scheduled follow-up with Dr. Hatfield with 8/21.      Past Medical History:   Diagnosis Date    Anxiety     Asthma     Bipolar affective  "disorder in full remission     Depression     Depression     Fatty liver disease, nonalcoholic     Hernia of abdominal cavity     Hypertension     Menses, irregular        Past Surgical History:   Procedure Laterality Date    ADENOIDECTOMY      DENTAL PROCEDURE      EAR TUBES      HYSTEROSCOPY      TONSILLECTOMY           Current Outpatient Medications:     atenolol (TENORMIN) 25 MG tablet, Take 1 tablet by mouth Daily., Disp: 30 tablet, Rfl: 2    Cariprazine HCl (VRAYLAR) 3 MG capsule capsule, Take 1 mg by mouth Daily., Disp: , Rfl:     FLUoxetine (PROzac) 20 MG tablet, , Disp: , Rfl:     Loratadine 10 MG capsule, Take by oral route., Disp: , Rfl:     pantoprazole (PROTONIX) 40 MG EC tablet, Take 1 tablet by mouth Daily., Disp: , Rfl:     topiramate (TOPAMAX) 50 MG tablet, Take 1 tablet by mouth Every 12 (Twelve) Hours for 30 days., Disp: 60 tablet, Rfl: 0    Social History     Socioeconomic History    Marital status:    Tobacco Use    Smoking status: Never     Passive exposure: Never    Smokeless tobacco: Never   Vaping Use    Vaping Use: Never used   Substance and Sexual Activity    Alcohol use: Yes     Alcohol/week: 1.0 standard drink     Types: 1 Glasses of wine per week     Comment: occ    Drug use: Never    Sexual activity: Yes     Partners: Male       History reviewed. No pertinent family history.    The following portions of the patient's history were reviewed and updated as appropriate: allergies, current medications, past family history, past medical history, past social history, past surgical history and problem list.    ROS  /92 (BP Location: Left arm, Patient Position: Standing)   Pulse 78   Ht 167.6 cm (66\")   Wt (!) 141 kg (310 lb)   LMP 05/15/2023 Comment: pcos irregular periods  SpO2 97%   BMI 50.04 kg/m² .  Objective     Physical Exam    Physical Exam:  Neuro:  CV:  Resp:  GI:  Ext:  Pysch: AAOx3, no gross deficits  S1S2 RRR, no murmur  Non-labored, CTA  BS+, abd soft  Pedal " pulses palp, no edema  Calm and cooperative       Procedures

## 2023-08-21 ENCOUNTER — OFFICE VISIT (OUTPATIENT)
Dept: CARDIOLOGY | Facility: CLINIC | Age: 28
End: 2023-08-21
Payer: MEDICAID

## 2023-08-21 VITALS
RESPIRATION RATE: 18 BRPM | SYSTOLIC BLOOD PRESSURE: 123 MMHG | DIASTOLIC BLOOD PRESSURE: 81 MMHG | HEART RATE: 84 BPM | WEIGHT: 293 LBS | OXYGEN SATURATION: 99 % | HEIGHT: 66 IN | BODY MASS INDEX: 47.09 KG/M2

## 2023-08-21 DIAGNOSIS — F32.A DEPRESSION, UNSPECIFIED DEPRESSION TYPE: ICD-10-CM

## 2023-08-21 DIAGNOSIS — R00.0 INAPPROPRIATE SINUS TACHYCARDIA: Primary | ICD-10-CM

## 2023-08-21 DIAGNOSIS — R55 SYNCOPE AND COLLAPSE: ICD-10-CM

## 2023-08-21 PROCEDURE — 1159F MED LIST DOCD IN RCRD: CPT | Performed by: INTERNAL MEDICINE

## 2023-08-21 PROCEDURE — 1160F RVW MEDS BY RX/DR IN RCRD: CPT | Performed by: INTERNAL MEDICINE

## 2023-08-21 PROCEDURE — 99214 OFFICE O/P EST MOD 30 MIN: CPT | Performed by: INTERNAL MEDICINE

## 2023-08-21 RX ORDER — NAPROXEN 375 MG/1
500 TABLET ORAL
COMMUNITY
Start: 2023-08-02

## 2023-08-21 NOTE — PROGRESS NOTES
Cardiology Office Visit      Encounter Date:  08/21/2023    Patient ID:   Jazmine Read is a 28 y.o. female.    Reason For Followup:  Syncope    Brief Clinical History:  Dear Deepti Stanley PA    I had the pleasure of seeing Jazmine Read today. As you are well aware, this is a 28 y.o. female seen in clinic today for hospital follow-up.     Patient has no prior history of ischemic heart disease.  She was hospitalized 6/17/23 recently for syncope.  2D echo 6/2023 showed an EF = 51-55% with trace TR. She was discharged home with an ambulatory heart monitor.  She had several more syncopal episodes while wearing it, which only showed sinus tachycardia.  In office orthostatic v/s were negative.  She was planned for outpatient tilt table test and outpatient neurology evaluation.  PMH includes HTN, asthma, migraine, obesity, and anxiety.     She was hospitalized again 7/2023 for recurrent syncope.  She was seen by neurology and MRI brain was normal, EEG was normal, and her Topamax was increased.  She underwent outpatient tilt table test in which patient is reported with early syncope without hypotension or bradycardia.  She demonstrated sinus tachycardia throughout testing.  She was started on atenolol.       Patient still reports intermittent dizziness and headaches but states she has not had a syncopal episode in over a week.  She has been keeping a blood pressure diary and she has not had any hypotension since starting the new medication.  Overall she reports feeling better.        ASSESSMENT/PLAN:        Diagnoses and all orders for this visit:     1. Inappropriate sinus tachycardia (Primary)     2. Syncope and collapse/neurocardiogenic syncope      Refer to psychiatry for further evaluation and treatment options of underlying anxiety and depression and stress  No significant echocardiographic abnormalities  Likely inappropriate sinus tachycardia and neurocardiogenic syncope  Patient feels better with the  "beta-blocker  Continue follow-up with neurology and psychiatry  Need for regular exercise weight loss reviewed and discussed with patient  Conservative measures for neurocardiogenic syncope and inappropriate sinus tachycardia reviewed and discussed with patient  Avoid dehydration  Continue close monitoring and follow-up  Prior work-up during prior hospitalizations reviewed and discussed with patient          Lab Results   Component Value Date    GLUCOSE 123 (H) 07/17/2023    BUN 13 07/17/2023    CREATININE 0.71 07/17/2023    EGFR 120.4 07/17/2023    BCR 18.3 07/17/2023    K 3.9 07/17/2023    CO2 24.0 07/17/2023    CALCIUM 8.7 07/17/2023    ALBUMIN 4.2 07/15/2023    BILITOT <0.2 07/15/2023    AST 12 07/15/2023    ALT 17 07/15/2023     Results for orders placed during the hospital encounter of 06/16/23    Adult Transthoracic Echo Complete W/ Cont if Necessary Per Protocol    Interpretation Summary    Left ventricular systolic function is normal. Calculated left ventricular EF = 54.9% Left ventricular ejection fraction appears to be 51 - 55%.    Estimated right ventricular systolic pressure from tricuspid regurgitation is normal (<35 mmHg).     No results found for this or any previous visit.     No results found for: CHOL, CHLPL, TRIG, HDL, LDL, LDLDIRECT    Results for orders placed during the hospital encounter of 07/21/23    Tilt Table           Objective:    Vitals:  Vitals:    08/21/23 1428   BP: 123/81   BP Location: Left arm   Patient Position: Sitting   Cuff Size: Large Adult   Pulse: 84   Resp: 18   SpO2: 99%   Weight: (!) 142 kg (312 lb)   Height: 167.6 cm (66\")       Physical Exam:    General: Alert, cooperative, no distress, appears stated age  Head:  Normocephalic, atraumatic, mucous membranes moist  Eyes:  Conjunctiva/corneas clear, EOM's intact     Neck:  Supple,  no adenopathy;      Lungs: Clear to auscultation bilaterally, no wheezes rhonchi rales are noted  Chest wall: No tenderness  Heart::  Regular " rate and rhythm, S1 and S2 normal, no murmur, rub or gallop  Abdomen: Soft, non-tender, nondistended bowel sounds active  Extremities: No cyanosis, clubbing, or edema  Pulses: 2+ and symmetric all extremities  Skin:  No rashes or lesions  Neuro/psych: A&O x3. CN II through XII are grossly intact with appropriate affect      Allergies:  Allergies   Allergen Reactions    Amoxicillin Unknown - High Severity    Penicillins Hives    Shrimp Itching    Shrimp Extract Allergy Skin Test Itching    Cephalosporins Rash       Medication Review:     Current Outpatient Medications:     atenolol (TENORMIN) 25 MG tablet, Take 1 tablet by mouth Daily., Disp: 30 tablet, Rfl: 11    Cariprazine HCl (VRAYLAR) 3 MG capsule capsule, Take 1 mg by mouth Daily., Disp: , Rfl:     FLUoxetine (PROzac) 20 MG tablet, , Disp: , Rfl:     Loratadine 10 MG capsule, Take by oral route., Disp: , Rfl:     naproxen (NAPROSYN) 375 MG tablet, 500 mg., Disp: , Rfl:     pantoprazole (PROTONIX) 40 MG EC tablet, Take 1 tablet by mouth Daily., Disp: , Rfl:     topiramate (TOPAMAX) 50 MG tablet, Take 1 tablet by mouth Every 12 (Twelve) Hours for 30 days., Disp: 60 tablet, Rfl: 0    Family History:  History reviewed. No pertinent family history.    Past Medical History:  Past Medical History:   Diagnosis Date    Anxiety     Asthma     Bipolar affective disorder in full remission     Depression     Depression     Fatty liver disease, nonalcoholic     Hernia of abdominal cavity     Hypertension     Menses, irregular        Past surgical History:  Past Surgical History:   Procedure Laterality Date    ADENOIDECTOMY      DENTAL PROCEDURE      EAR TUBES      HYSTEROSCOPY      TONSILLECTOMY         Social History:  Social History     Socioeconomic History    Marital status:    Tobacco Use    Smoking status: Never     Passive exposure: Never    Smokeless tobacco: Never   Vaping Use    Vaping Use: Never used   Substance and Sexual Activity    Alcohol use: Yes      Alcohol/week: 1.0 standard drink     Types: 1 Glasses of wine per week     Comment: occ    Drug use: Never    Sexual activity: Yes     Partners: Male       Review of Systems:  The following systems were reviewed as they relate to the cardiovascular system: Constitutional, Eyes, ENT, Cardiovascular, Respiratory, Gastrointestinal, Integumentary, Neurological, Psychiatric, Hematologic, Endocrine, Musculoskeletal, and Genitourinary. The pertinent cardiovascular findings are reported above with all other cardiovascular points within those systems being negative.    Diagnostic Study Review:     Current Electrocardiogram:  Procedures              NOTE: The following portions of the patient's history were reviewed and updated this visit as appropriate: allergies, current medications, past family history, past medical history, past social history, past surgical history and problem list.

## 2023-09-07 ENCOUNTER — TELEPHONE (OUTPATIENT)
Dept: CARDIOLOGY | Facility: CLINIC | Age: 28
End: 2023-09-07
Payer: MEDICAID

## 2023-09-07 NOTE — TELEPHONE ENCOUNTER
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

## 2023-11-13 ENCOUNTER — OFFICE VISIT (OUTPATIENT)
Dept: CARDIOLOGY | Facility: CLINIC | Age: 28
End: 2023-11-13
Payer: MEDICAID

## 2023-11-13 VITALS
HEART RATE: 112 BPM | BODY MASS INDEX: 47.09 KG/M2 | HEIGHT: 66 IN | SYSTOLIC BLOOD PRESSURE: 137 MMHG | WEIGHT: 293 LBS | OXYGEN SATURATION: 96 % | DIASTOLIC BLOOD PRESSURE: 92 MMHG

## 2023-11-13 DIAGNOSIS — R55 SYNCOPE AND COLLAPSE: ICD-10-CM

## 2023-11-13 DIAGNOSIS — I47.11 INAPPROPRIATE SINUS TACHYCARDIA: Primary | ICD-10-CM

## 2023-11-13 PROCEDURE — 1160F RVW MEDS BY RX/DR IN RCRD: CPT | Performed by: INTERNAL MEDICINE

## 2023-11-13 PROCEDURE — 99214 OFFICE O/P EST MOD 30 MIN: CPT | Performed by: INTERNAL MEDICINE

## 2023-11-13 PROCEDURE — 1159F MED LIST DOCD IN RCRD: CPT | Performed by: INTERNAL MEDICINE

## 2023-11-13 RX ORDER — ATENOLOL 50 MG/1
50 TABLET ORAL DAILY
Qty: 90 TABLET | Refills: 3 | Status: ON HOLD | OUTPATIENT
Start: 2023-11-13

## 2023-11-13 NOTE — PROGRESS NOTES
Cardiology Office Visit      Encounter Date:  11/13/2023    Patient ID:   Jazmine Read is a 28 y.o. female.    Reason For Followup:  Syncope    Brief Clinical History:  Dear Deepti Stanley PA    I had the pleasure of seeing Jazmine Read today. As you are well aware, this is a 28 y.o. female seen in clinic today for hospital follow-up.     Patient has no prior history of ischemic heart disease.  She was hospitalized 6/17/23 recently for syncope.  2D echo 6/2023 showed an EF = 51-55% with trace TR. She was discharged home with an ambulatory heart monitor.  She had several more syncopal episodes while wearing it, which only showed sinus tachycardia.  In office orthostatic v/s were negative.  She was planned for outpatient tilt table test and outpatient neurology evaluation.  PMH includes HTN, asthma, migraine, obesity, and anxiety.     She was hospitalized again 7/2023 for recurrent syncope.  She was seen by neurology and MRI brain was normal, EEG was normal, and her Topamax was increased.  She underwent outpatient tilt table test in which patient is reported with early syncope without hypotension or bradycardia.  She demonstrated sinus tachycardia throughout testing.  She was started on atenolol.       Patient still reports intermittent dizziness and headaches but states she has not had a syncopal episode in over a week.  She has been keeping a blood pressure diary and she has not had any hypotension since starting the new medication.  Overall she reports feeling better.        ASSESSMENT/PLAN:        Diagnoses and all orders for this visit:     1. Inappropriate sinus tachycardia (Primary)     2. Syncope and collapse/neurocardiogenic syncope      Refer to psychiatry for further evaluation and treatment options of underlying anxiety and depression and stress  No significant echocardiographic abnormalities  Likely inappropriate sinus tachycardia and neurocardiogenic syncope  Patient feels better with the  "beta-blocker  Continue follow-up with neurology and psychiatry  Need for regular exercise weight loss reviewed and discussed with patient  Conservative measures for neurocardiogenic syncope and inappropriate sinus tachycardia reviewed and discussed with patient  Avoid dehydration  Continue close monitoring and follow-up  Prior work-up during prior hospitalizations reviewed and discussed with patient  Increase the dose of atenolol to 50 mg p.o. once a day  Prior work-up reviewed and discussed with patient  Continue close monitoring and follow-up  Close monitoring of blood pressure at home  Follow-up with psychiatry as scheduled  Follow-up in office in 6 months      Vitals:  Vitals:    11/13/23 1408   BP: 137/92   BP Location: Left arm   Patient Position: Sitting   Pulse: 112   SpO2: 96%   Weight: (!) 141 kg (310 lb)   Height: 167.6 cm (66\")       Physical Exam:    General: Alert, cooperative, no distress, appears stated age  Head:  Normocephalic, atraumatic, mucous membranes moist  Eyes:  Conjunctiva/corneas clear, EOM's intact     Neck:  Supple,  no adenopathy;      Lungs: Clear to auscultation bilaterally, no wheezes rhonchi rales are noted  Chest wall: No tenderness  Heart::  Regular rate and rhythm, S1 and S2 normal, no murmur, rub or gallop  Abdomen: Soft, non-tender, nondistended bowel sounds active  Extremities: No cyanosis, clubbing, or edema  Pulses: 2+ and symmetric all extremities  Skin:  No rashes or lesions  Neuro/psych: A&O x3. CN II through XII are grossly intact with appropriate affect              Lab Results   Component Value Date    GLUCOSE 123 (H) 07/17/2023    BUN 13 07/17/2023    CREATININE 0.71 07/17/2023    EGFR 120.4 07/17/2023    BCR 18.3 07/17/2023    K 3.9 07/17/2023    CO2 24.0 07/17/2023    CALCIUM 8.7 07/17/2023    ALBUMIN 4.2 07/15/2023    BILITOT <0.2 07/15/2023    AST 12 07/15/2023    ALT 17 07/15/2023     Results for orders placed during the hospital encounter of 06/16/23    Adult " "Transthoracic Echo Complete W/ Cont if Necessary Per Protocol    Interpretation Summary    Left ventricular systolic function is normal. Calculated left ventricular EF = 54.9% Left ventricular ejection fraction appears to be 51 - 55%.    Estimated right ventricular systolic pressure from tricuspid regurgitation is normal (<35 mmHg).     No results found for this or any previous visit.     No results found for: \"CHOL\", \"CHLPL\", \"TRIG\", \"HDL\", \"LDL\", \"LDLDIRECT\"    Results for orders placed during the hospital encounter of 07/21/23    Tilt Table           Objective:          Allergies:  Allergies   Allergen Reactions    Amoxicillin Unknown - High Severity    Penicillins Hives    Shrimp Itching    Shrimp Extract Allergy Skin Test Itching    Cephalosporins Rash       Medication Review:     Current Outpatient Medications:     FLUoxetine (PROzac) 20 MG tablet, , Disp: , Rfl:     pantoprazole (PROTONIX) 40 MG EC tablet, Take 1 tablet by mouth Daily., Disp: , Rfl:     atenolol (TENORMIN) 50 MG tablet, Take 1 tablet by mouth Daily., Disp: 90 tablet, Rfl: 3    Family History:  History reviewed. No pertinent family history.    Past Medical History:  Past Medical History:   Diagnosis Date    Anxiety     Asthma     Bipolar affective disorder in full remission     Depression     Depression     Fatty liver disease, nonalcoholic     Hernia of abdominal cavity     Hypertension     Menses, irregular        Past surgical History:  Past Surgical History:   Procedure Laterality Date    ADENOIDECTOMY      DENTAL PROCEDURE      EAR TUBES      HYSTEROSCOPY      TONSILLECTOMY         Social History:  Social History     Socioeconomic History    Marital status:    Tobacco Use    Smoking status: Never     Passive exposure: Never    Smokeless tobacco: Never   Vaping Use    Vaping Use: Never used   Substance and Sexual Activity    Alcohol use: Yes     Alcohol/week: 1.0 standard drink of alcohol     Types: 1 Glasses of wine per week     " Comment: occ    Drug use: Never    Sexual activity: Yes     Partners: Male       Review of Systems:  The following systems were reviewed as they relate to the cardiovascular system: Constitutional, Eyes, ENT, Cardiovascular, Respiratory, Gastrointestinal, Integumentary, Neurological, Psychiatric, Hematologic, Endocrine, Musculoskeletal, and Genitourinary. The pertinent cardiovascular findings are reported above with all other cardiovascular points within those systems being negative.    Diagnostic Study Review:     Current Electrocardiogram:  Procedures          NOTE: The following portions of the patient's history were reviewed and updated this visit as appropriate: allergies, current medications, past family history, past medical history, past social history, past surgical history and problem list.

## 2023-11-14 ENCOUNTER — HOSPITAL ENCOUNTER (EMERGENCY)
Facility: HOSPITAL | Age: 28
Discharge: HOME | End: 2023-11-14
Attending: EMERGENCY MEDICINE | Admitting: EMERGENCY MEDICINE
Payer: MEDICAID

## 2023-11-14 VITALS
DIASTOLIC BLOOD PRESSURE: 65 MMHG | SYSTOLIC BLOOD PRESSURE: 119 MMHG | WEIGHT: 293 LBS | TEMPERATURE: 98 F | RESPIRATION RATE: 18 BRPM | HEART RATE: 90 BPM | HEIGHT: 66 IN | OXYGEN SATURATION: 96 % | BODY MASS INDEX: 47.09 KG/M2

## 2023-11-14 DIAGNOSIS — R55 SYNCOPE, UNSPECIFIED SYNCOPE TYPE: Primary | ICD-10-CM

## 2023-11-14 LAB
ALBUMIN SERPL-MCNC: 3.9 G/DL (ref 3.5–5.2)
ALBUMIN/GLOB SERPL: 1.2 G/DL
ALP SERPL-CCNC: 70 U/L (ref 39–117)
ALT SERPL W P-5'-P-CCNC: 14 U/L (ref 1–33)
ANION GAP SERPL CALCULATED.3IONS-SCNC: 11 MMOL/L (ref 5–15)
AST SERPL-CCNC: 11 U/L (ref 1–32)
BASOPHILS # BLD AUTO: 0.1 10*3/MM3 (ref 0–0.2)
BASOPHILS NFR BLD AUTO: 0.7 % (ref 0–1.5)
BILIRUB SERPL-MCNC: 0.2 MG/DL (ref 0–1.2)
BUN SERPL-MCNC: 11 MG/DL (ref 6–20)
BUN/CREAT SERPL: 20.8 (ref 7–25)
CALCIUM SPEC-SCNC: 9.4 MG/DL (ref 8.6–10.5)
CHLORIDE SERPL-SCNC: 105 MMOL/L (ref 98–107)
CO2 SERPL-SCNC: 25 MMOL/L (ref 22–29)
CREAT SERPL-MCNC: 0.53 MG/DL (ref 0.57–1)
DEPRECATED RDW RBC AUTO: 44.6 FL (ref 37–54)
EGFRCR SERPLBLD CKD-EPI 2021: 130.2 ML/MIN/1.73
EOSINOPHIL # BLD AUTO: 0.1 10*3/MM3 (ref 0–0.4)
EOSINOPHIL NFR BLD AUTO: 1 % (ref 0.3–6.2)
ERYTHROCYTE [DISTWIDTH] IN BLOOD BY AUTOMATED COUNT: 16 % (ref 12.3–15.4)
GLOBULIN UR ELPH-MCNC: 3.3 GM/DL
GLUCOSE BLDC GLUCOMTR-MCNC: 86 MG/DL (ref 70–105)
GLUCOSE SERPL-MCNC: 92 MG/DL (ref 65–99)
HCG SERPL QL: NEGATIVE
HCT VFR BLD AUTO: 41.1 % (ref 34–46.6)
HGB BLD-MCNC: 13.3 G/DL (ref 12–15.9)
LYMPHOCYTES # BLD AUTO: 2.2 10*3/MM3 (ref 0.7–3.1)
LYMPHOCYTES NFR BLD AUTO: 23.4 % (ref 19.6–45.3)
MAGNESIUM SERPL-MCNC: 2 MG/DL (ref 1.6–2.6)
MCH RBC QN AUTO: 26 PG (ref 26.6–33)
MCHC RBC AUTO-ENTMCNC: 32.5 G/DL (ref 31.5–35.7)
MCV RBC AUTO: 80.2 FL (ref 79–97)
MONOCYTES # BLD AUTO: 0.5 10*3/MM3 (ref 0.1–0.9)
MONOCYTES NFR BLD AUTO: 5.3 % (ref 5–12)
NEUTROPHILS NFR BLD AUTO: 6.6 10*3/MM3 (ref 1.7–7)
NEUTROPHILS NFR BLD AUTO: 69.6 % (ref 42.7–76)
NRBC BLD AUTO-RTO: 0 /100 WBC (ref 0–0.2)
PLATELET # BLD AUTO: 417 10*3/MM3 (ref 140–450)
PMV BLD AUTO: 7.8 FL (ref 6–12)
POTASSIUM SERPL-SCNC: 4.3 MMOL/L (ref 3.5–5.2)
PROT SERPL-MCNC: 7.2 G/DL (ref 6–8.5)
RBC # BLD AUTO: 5.13 10*6/MM3 (ref 3.77–5.28)
SODIUM SERPL-SCNC: 141 MMOL/L (ref 136–145)
TSH SERPL DL<=0.05 MIU/L-ACNC: 1.27 UIU/ML (ref 0.27–4.2)
WBC NRBC COR # BLD: 9.6 10*3/MM3 (ref 3.4–10.8)

## 2023-11-14 PROCEDURE — 93005 ELECTROCARDIOGRAM TRACING: CPT | Performed by: EMERGENCY MEDICINE

## 2023-11-14 PROCEDURE — 99283 EMERGENCY DEPT VISIT LOW MDM: CPT

## 2023-11-14 PROCEDURE — 93005 ELECTROCARDIOGRAM TRACING: CPT

## 2023-11-14 PROCEDURE — 84703 CHORIONIC GONADOTROPIN ASSAY: CPT | Performed by: EMERGENCY MEDICINE

## 2023-11-14 PROCEDURE — 82948 REAGENT STRIP/BLOOD GLUCOSE: CPT

## 2023-11-14 PROCEDURE — 83735 ASSAY OF MAGNESIUM: CPT | Performed by: EMERGENCY MEDICINE

## 2023-11-14 PROCEDURE — 80050 GENERAL HEALTH PANEL: CPT | Performed by: EMERGENCY MEDICINE

## 2023-11-14 RX ORDER — SODIUM CHLORIDE 0.9 % (FLUSH) 0.9 %
10 SYRINGE (ML) INJECTION AS NEEDED
Status: DISCONTINUED | OUTPATIENT
Start: 2023-11-14 | End: 2023-11-14 | Stop reason: HOSPADM

## 2023-11-14 NOTE — ED PROVIDER NOTES
Subjective   History of Present Illness  28-year-old female states he had a syncopal episode while sitting at her computer today at work.  States she was feeling hot.  She reports no chest pain or shortness of breath or headache or focal numbness or weakness.  States she had eaten some cheese its earlier this morning but had not yet eaten lunch.  States that is not atypical for her routine.  She reports no vomiting or diarrhea or abdominal pain.  States she had a miscarriage last month and has had no vaginal bleeding since.  States she had numerous episodes of syncopal events in the past and is being seen by cardiology.  States her last syncopal episode was about 3 months ago.  She saw her cardiologist yesterday in office for a follow-up visit and was advised to increase her metoprolol and took her first increased dose yesterday evening.  Review of Systems    Past Medical History:   Diagnosis Date    Anxiety     Asthma     Bipolar affective disorder in full remission     Depression     Depression     Fatty liver disease, nonalcoholic     Hernia of abdominal cavity     Hypertension     Menses, irregular        Allergies   Allergen Reactions    Amoxicillin Unknown - High Severity    Penicillins Hives    Shrimp Itching    Shrimp Extract Allergy Skin Test Itching    Cephalosporins Rash       Past Surgical History:   Procedure Laterality Date    ADENOIDECTOMY      DENTAL PROCEDURE      EAR TUBES      HYSTEROSCOPY      TONSILLECTOMY         No family history on file.    Social History     Socioeconomic History    Marital status:    Tobacco Use    Smoking status: Never     Passive exposure: Never    Smokeless tobacco: Never   Vaping Use    Vaping Use: Never used   Substance and Sexual Activity    Alcohol use: Yes     Alcohol/week: 1.0 standard drink of alcohol     Types: 1 Glasses of wine per week     Comment: occ    Drug use: Never    Sexual activity: Yes     Partners: Male       Prior to Admission medications   "  Medication Sig Start Date End Date Taking? Authorizing Provider   atenolol (TENORMIN) 50 MG tablet Take 1 tablet by mouth Daily. 11/13/23   Peyman Hatfield MD   FLUoxetine (PROzac) 20 MG tablet  7/2/23   ProviderBassam MD   pantoprazole (PROTONIX) 40 MG EC tablet Take 1 tablet by mouth Daily.    ProviderBassam MD     /71 (Patient Position: Standing)   Pulse 77   Temp 97.7 °F (36.5 °C) (Oral)   Resp 16   Ht 167.6 cm (66\")   Wt (!) 140 kg (308 lb)   SpO2 95%   BMI 49.71 kg/m²       Objective   Physical Exam  General: Obese female, well-appearing, no acute distress, alert and appropriate  Eyes: Pupils round and equal, sclera nonicteric  HEENT: Mucous membranes moist, no mucosal swelling  Neck: Supple, no nuchal rigidity, no JVD  Respirations: Respirations nonlabored, equal breath sounds bilaterally, clear lungs  Heart regular rate and rhythm, no murmurs rubs or gallops,   Abdomen soft nontender nondistended, no hepatosplenomegaly, no hernia, no mass, normal bowel sounds, no CVA tenderness  Extremities no clubbing cyanosis or edema, calves are symmetric and nontender  Neuro cranial nerves II through XII intact , normal sensory/motor function and strength in four extremities, no slurred speech, no facial droop, normal finger to nose,   Psych oriented, pleasant affect  Skin no rash, brisk cap refill  Procedures           ED Course      Results for orders placed or performed during the hospital encounter of 11/14/23   Comprehensive Metabolic Panel    Specimen: Arm, Right; Blood   Result Value Ref Range    Glucose 92 65 - 99 mg/dL    BUN 11 6 - 20 mg/dL    Creatinine 0.53 (L) 0.57 - 1.00 mg/dL    Sodium 141 136 - 145 mmol/L    Potassium 4.3 3.5 - 5.2 mmol/L    Chloride 105 98 - 107 mmol/L    CO2 25.0 22.0 - 29.0 mmol/L    Calcium 9.4 8.6 - 10.5 mg/dL    Total Protein 7.2 6.0 - 8.5 g/dL    Albumin 3.9 3.5 - 5.2 g/dL    ALT (SGPT) 14 1 - 33 U/L    AST (SGOT) 11 1 - 32 U/L    Alkaline " Phosphatase 70 39 - 117 U/L    Total Bilirubin 0.2 0.0 - 1.2 mg/dL    Globulin 3.3 gm/dL    A/G Ratio 1.2 g/dL    BUN/Creatinine Ratio 20.8 7.0 - 25.0    Anion Gap 11.0 5.0 - 15.0 mmol/L    eGFR 130.2 >60.0 mL/min/1.73   hCG, Serum, Qualitative    Specimen: Arm, Right; Blood   Result Value Ref Range    HCG Qualitative Negative Negative   TSH    Specimen: Arm, Right; Blood   Result Value Ref Range    TSH 1.270 0.270 - 4.200 uIU/mL   Magnesium    Specimen: Arm, Right; Blood   Result Value Ref Range    Magnesium 2.0 1.6 - 2.6 mg/dL   CBC Auto Differential    Specimen: Arm, Right; Blood   Result Value Ref Range    WBC 9.60 3.40 - 10.80 10*3/mm3    RBC 5.13 3.77 - 5.28 10*6/mm3    Hemoglobin 13.3 12.0 - 15.9 g/dL    Hematocrit 41.1 34.0 - 46.6 %    MCV 80.2 79.0 - 97.0 fL    MCH 26.0 (L) 26.6 - 33.0 pg    MCHC 32.5 31.5 - 35.7 g/dL    RDW 16.0 (H) 12.3 - 15.4 %    RDW-SD 44.6 37.0 - 54.0 fl    MPV 7.8 6.0 - 12.0 fL    Platelets 417 140 - 450 10*3/mm3    Neutrophil % 69.6 42.7 - 76.0 %    Lymphocyte % 23.4 19.6 - 45.3 %    Monocyte % 5.3 5.0 - 12.0 %    Eosinophil % 1.0 0.3 - 6.2 %    Basophil % 0.7 0.0 - 1.5 %    Neutrophils, Absolute 6.60 1.70 - 7.00 10*3/mm3    Lymphocytes, Absolute 2.20 0.70 - 3.10 10*3/mm3    Monocytes, Absolute 0.50 0.10 - 0.90 10*3/mm3    Eosinophils, Absolute 0.10 0.00 - 0.40 10*3/mm3    Basophils, Absolute 0.10 0.00 - 0.20 10*3/mm3    nRBC 0.0 0.0 - 0.2 /100 WBC   POC Glucose Once    Specimen: Blood   Result Value Ref Range    Glucose 86 70 - 105 mg/dL   ECG 12 Lead Syncope   Result Value Ref Range    QT Interval 379 ms    QTC Interval 458 ms                                          Medical Decision Making  Patient presented with syncope differential diagnosis including acute coronary syndrome, pneumothorax, pulmonary embolus, pregnancy, seizure, CVA    Patient has a normal neurologic exam, PERC score 0, she has had multiple syncopal episodes in the past and has ongoing cardiology work-up for  this.  She was stable and asymptomatic during the emergency room course.  Orthostatic measurements were unremarkable.  We did discuss the prolonged fasting that she had had this morning and was advised to avoid this as well as drinking plenty of fluids.  Also notably she had been advised to increase her beta-blocker dose last night.  I do recommend that she goes back to her lower dose and follow-up with Dr. Seaman this week.  She is discharged good condition and given warning signs for return.    Problems Addressed:  Syncope, unspecified syncope type: complicated acute illness or injury    Amount and/or Complexity of Data Reviewed  Labs: ordered. Decision-making details documented in ED Course.     Details: CBC normal, hCG negative, TSH normal, comprehensive metabolic panel normal  ECG/medicine tests: ordered and independent interpretation performed.     Details: My EKG interpretation sinus rhythm, rate of 88, no acute ST or T wave abnormality    Risk  Prescription drug management.        Final diagnoses:   Syncope, unspecified syncope type       ED Disposition  ED Disposition       ED Disposition   Discharge    Condition   Stable    Comment   --               No follow-up provider specified.       Medication List      No changes were made to your prescriptions during this visit.            David Hawkins MD  11/14/23 2162

## 2023-11-14 NOTE — ED NOTES
"Pt presented to the ED via EMS with complaints of syncope episode while at work. Pt states around noon today she \"passed out\" at work while at her desk. Pt states she did lose consciousness. Pt states she is not aware of if she hit her head and pt states she does feel like she has a generalized headache. Pt denies vomiting after coming back too. Pt states her cardiologist recently increased her beta blocker medication from 25 mg to 50 mg. Pt states she took her first dose last night 11/13/2023. Pt denies chest pain and SOB at this time. Pt is alert and oriented to person, place, time, and situation. Pt placed in gown and on continuous cardiac and oxygen saturation monitoring.   "

## 2023-11-14 NOTE — DISCHARGE INSTRUCTIONS
Drink plenty of fluids, avoid prolonged fasting.  Follow-up with Dr. Seaman for ongoing syncope evaluation.  Lower your dose of antihypertensives to your previous dose until further notice with Dr. Seaman.  Return for increased pain, fever, shortness of breath or any other concerns

## 2023-11-15 LAB
QT INTERVAL: 379 MS
QTC INTERVAL: 458 MS

## 2023-11-17 ENCOUNTER — TELEPHONE (OUTPATIENT)
Dept: CARDIOLOGY | Facility: CLINIC | Age: 28
End: 2023-11-17

## 2023-11-17 NOTE — TELEPHONE ENCOUNTER
Caller: BO    Relationship: SELF    Best call back number: [phone]    What is the best time to reach you: ANY        What was the call regarding: PATIENT WAS CALLING IN TO SCHEDULE A FOLLOW UP---- PATIENT SAW DR. MIRANDA ON 11/13/23.  SHE WENT TO THE ED BY AMBULANCE ON 11/14/23 (PASSED OUT).  SHE STATED SHE HAD ANOTHER EPISODE TODAY WHERE SHE PASSED OUT AS WELL.

## 2023-11-19 ENCOUNTER — HOSPITAL ENCOUNTER (OUTPATIENT)
Facility: HOSPITAL | Age: 28
Setting detail: OBSERVATION
Discharge: HOME | End: 2023-11-21
Attending: EMERGENCY MEDICINE | Admitting: EMERGENCY MEDICINE
Payer: MEDICAID

## 2023-11-19 ENCOUNTER — APPOINTMENT (OUTPATIENT)
Dept: GENERAL RADIOLOGY | Facility: HOSPITAL | Age: 28
End: 2023-11-19
Payer: MEDICAID

## 2023-11-19 DIAGNOSIS — R07.9 CHEST PAIN, UNSPECIFIED TYPE: ICD-10-CM

## 2023-11-19 DIAGNOSIS — R55 SYNCOPE AND COLLAPSE: Primary | ICD-10-CM

## 2023-11-19 LAB
BASOPHILS # BLD AUTO: 0.2 10*3/MM3 (ref 0–0.2)
BASOPHILS NFR BLD AUTO: 1.5 % (ref 0–1.5)
DEPRECATED RDW RBC AUTO: 45.1 FL (ref 37–54)
EOSINOPHIL # BLD AUTO: 0.1 10*3/MM3 (ref 0–0.4)
EOSINOPHIL NFR BLD AUTO: 0.8 % (ref 0.3–6.2)
ERYTHROCYTE [DISTWIDTH] IN BLOOD BY AUTOMATED COUNT: 15.5 % (ref 12.3–15.4)
HCT VFR BLD AUTO: 40.8 % (ref 34–46.6)
HGB BLD-MCNC: 13.3 G/DL (ref 12–15.9)
LYMPHOCYTES # BLD AUTO: 2.7 10*3/MM3 (ref 0.7–3.1)
LYMPHOCYTES NFR BLD AUTO: 21.7 % (ref 19.6–45.3)
MCH RBC QN AUTO: 25.4 PG (ref 26.6–33)
MCHC RBC AUTO-ENTMCNC: 32.6 G/DL (ref 31.5–35.7)
MCV RBC AUTO: 77.9 FL (ref 79–97)
MONOCYTES # BLD AUTO: 0.7 10*3/MM3 (ref 0.1–0.9)
MONOCYTES NFR BLD AUTO: 5.3 % (ref 5–12)
NEUTROPHILS NFR BLD AUTO: 70.7 % (ref 42.7–76)
NEUTROPHILS NFR BLD AUTO: 8.9 10*3/MM3 (ref 1.7–7)
NRBC BLD AUTO-RTO: 0 /100 WBC (ref 0–0.2)
PLATELET # BLD AUTO: 399 10*3/MM3 (ref 140–450)
PMV BLD AUTO: 7.9 FL (ref 6–12)
RBC # BLD AUTO: 5.23 10*6/MM3 (ref 3.77–5.28)
WBC NRBC COR # BLD AUTO: 12.6 10*3/MM3 (ref 3.4–10.8)

## 2023-11-19 PROCEDURE — 85730 THROMBOPLASTIN TIME PARTIAL: CPT | Performed by: EMERGENCY MEDICINE

## 2023-11-19 PROCEDURE — 85610 PROTHROMBIN TIME: CPT | Performed by: EMERGENCY MEDICINE

## 2023-11-19 PROCEDURE — 83735 ASSAY OF MAGNESIUM: CPT | Performed by: EMERGENCY MEDICINE

## 2023-11-19 PROCEDURE — 84484 ASSAY OF TROPONIN QUANT: CPT | Performed by: EMERGENCY MEDICINE

## 2023-11-19 PROCEDURE — 80050 GENERAL HEALTH PANEL: CPT | Performed by: EMERGENCY MEDICINE

## 2023-11-19 PROCEDURE — 71045 X-RAY EXAM CHEST 1 VIEW: CPT

## 2023-11-19 PROCEDURE — 93005 ELECTROCARDIOGRAM TRACING: CPT

## 2023-11-19 PROCEDURE — 93005 ELECTROCARDIOGRAM TRACING: CPT | Performed by: EMERGENCY MEDICINE

## 2023-11-19 PROCEDURE — 25810000003 SODIUM CHLORIDE 0.9 % SOLUTION: Performed by: EMERGENCY MEDICINE

## 2023-11-19 PROCEDURE — 85379 FIBRIN DEGRADATION QUANT: CPT | Performed by: EMERGENCY MEDICINE

## 2023-11-19 PROCEDURE — 99284 EMERGENCY DEPT VISIT MOD MDM: CPT

## 2023-11-19 RX ORDER — SODIUM CHLORIDE 0.9 % (FLUSH) 0.9 %
10 SYRINGE (ML) INJECTION AS NEEDED
Status: DISCONTINUED | OUTPATIENT
Start: 2023-11-19 | End: 2023-11-21 | Stop reason: HOSPADM

## 2023-11-19 RX ADMIN — SODIUM CHLORIDE 1000 ML: 9 INJECTION, SOLUTION INTRAVENOUS at 23:48

## 2023-11-20 ENCOUNTER — APPOINTMENT (OUTPATIENT)
Dept: CT IMAGING | Facility: HOSPITAL | Age: 28
End: 2023-11-20
Payer: MEDICAID

## 2023-11-20 ENCOUNTER — APPOINTMENT (OUTPATIENT)
Dept: CARDIOLOGY | Facility: HOSPITAL | Age: 28
End: 2023-11-20
Payer: MEDICAID

## 2023-11-20 LAB
ALBUMIN SERPL-MCNC: 4 G/DL (ref 3.5–5.2)
ALBUMIN/GLOB SERPL: 1.3 G/DL
ALP SERPL-CCNC: 55 U/L (ref 39–117)
ALT SERPL W P-5'-P-CCNC: 13 U/L (ref 1–33)
ANION GAP SERPL CALCULATED.3IONS-SCNC: 12 MMOL/L (ref 5–15)
ANION GAP SERPL CALCULATED.3IONS-SCNC: 9 MMOL/L (ref 5–15)
APTT PPP: 25.9 SECONDS (ref 61–76.5)
AST SERPL-CCNC: 11 U/L (ref 1–32)
BACTERIA UR QL AUTO: ABNORMAL /HPF
BILIRUB SERPL-MCNC: 0.2 MG/DL (ref 0–1.2)
BILIRUB UR QL STRIP: NEGATIVE
BUN SERPL-MCNC: 13 MG/DL (ref 6–20)
BUN SERPL-MCNC: 14 MG/DL (ref 6–20)
BUN/CREAT SERPL: 18.1 (ref 7–25)
BUN/CREAT SERPL: 23.7 (ref 7–25)
CALCIUM SPEC-SCNC: 8.6 MG/DL (ref 8.6–10.5)
CALCIUM SPEC-SCNC: 9.6 MG/DL (ref 8.6–10.5)
CHLORIDE SERPL-SCNC: 102 MMOL/L (ref 98–107)
CHLORIDE SERPL-SCNC: 106 MMOL/L (ref 98–107)
CLARITY UR: CLEAR
CO2 SERPL-SCNC: 23 MMOL/L (ref 22–29)
CO2 SERPL-SCNC: 26 MMOL/L (ref 22–29)
COLOR UR: YELLOW
CREAT SERPL-MCNC: 0.59 MG/DL (ref 0.57–1)
CREAT SERPL-MCNC: 0.72 MG/DL (ref 0.57–1)
D DIMER PPP FEU-MCNC: 0.33 MG/L (FEU) (ref 0–0.5)
EGFRCR SERPLBLD CKD-EPI 2021: 117.7 ML/MIN/1.73
EGFRCR SERPLBLD CKD-EPI 2021: 126.9 ML/MIN/1.73
GEN 5 2HR TROPONIN T REFLEX: 12 NG/L
GLOBULIN UR ELPH-MCNC: 3 GM/DL
GLUCOSE SERPL-MCNC: 94 MG/DL (ref 65–99)
GLUCOSE SERPL-MCNC: 99 MG/DL (ref 65–99)
GLUCOSE UR STRIP-MCNC: NEGATIVE MG/DL
HGB UR QL STRIP.AUTO: ABNORMAL
HOLD SPECIMEN: NORMAL
HYALINE CASTS UR QL AUTO: ABNORMAL /LPF
INR PPP: 0.99 (ref 0.93–1.1)
KETONES UR QL STRIP: ABNORMAL
LEUKOCYTE ESTERASE UR QL STRIP.AUTO: NEGATIVE
MAGNESIUM SERPL-MCNC: 2 MG/DL (ref 1.6–2.6)
NITRITE UR QL STRIP: NEGATIVE
PH UR STRIP.AUTO: 6 [PH] (ref 5–8)
POTASSIUM SERPL-SCNC: 4.1 MMOL/L (ref 3.5–5.2)
POTASSIUM SERPL-SCNC: 4.1 MMOL/L (ref 3.5–5.2)
PROT SERPL-MCNC: 7 G/DL (ref 6–8.5)
PROT UR QL STRIP: NEGATIVE
PROTHROMBIN TIME: 10.8 SECONDS (ref 9.6–11.7)
QT INTERVAL: 334 MS
QTC INTERVAL: 424 MS
RBC # UR STRIP: ABNORMAL /HPF
REF LAB TEST METHOD: ABNORMAL
SODIUM SERPL-SCNC: 137 MMOL/L (ref 136–145)
SODIUM SERPL-SCNC: 141 MMOL/L (ref 136–145)
SP GR UR STRIP: 1.02 (ref 1–1.03)
SQUAMOUS #/AREA URNS HPF: ABNORMAL /HPF
T3FREE SERPL-MCNC: 3.35 PG/ML (ref 2–4.4)
T4 FREE SERPL-MCNC: 1.26 NG/DL (ref 0.93–1.7)
TROPONIN T DELTA: NORMAL
TROPONIN T SERPL HS-MCNC: <6 NG/L
TSH SERPL DL<=0.05 MIU/L-ACNC: 2.5 UIU/ML (ref 0.27–4.2)
UROBILINOGEN UR QL STRIP: ABNORMAL
WBC # UR STRIP: ABNORMAL /HPF

## 2023-11-20 PROCEDURE — 96374 THER/PROPH/DIAG INJ IV PUSH: CPT

## 2023-11-20 PROCEDURE — G0378 HOSPITAL OBSERVATION PER HR: HCPCS

## 2023-11-20 PROCEDURE — 36415 COLL VENOUS BLD VENIPUNCTURE: CPT

## 2023-11-20 PROCEDURE — 80048 BASIC METABOLIC PNL TOTAL CA: CPT | Performed by: EMERGENCY MEDICINE

## 2023-11-20 PROCEDURE — 84484 ASSAY OF TROPONIN QUANT: CPT | Performed by: EMERGENCY MEDICINE

## 2023-11-20 PROCEDURE — 70450 CT HEAD/BRAIN W/O DYE: CPT

## 2023-11-20 PROCEDURE — 84481 FREE ASSAY (FT-3): CPT | Performed by: NURSE PRACTITIONER

## 2023-11-20 PROCEDURE — 33285 INSJ SUBQ CAR RHYTHM MNTR: CPT | Performed by: INTERNAL MEDICINE

## 2023-11-20 PROCEDURE — 84439 ASSAY OF FREE THYROXINE: CPT | Performed by: NURSE PRACTITIONER

## 2023-11-20 PROCEDURE — 33285 INSJ SUBQ CAR RHYTHM MNTR: CPT

## 2023-11-20 PROCEDURE — 25010000002 ONDANSETRON PER 1 MG: Performed by: EMERGENCY MEDICINE

## 2023-11-20 PROCEDURE — 81001 URINALYSIS AUTO W/SCOPE: CPT | Performed by: NURSE PRACTITIONER

## 2023-11-20 PROCEDURE — C1764 EVENT RECORDER, CARDIAC: HCPCS

## 2023-11-20 RX ORDER — ACETAMINOPHEN 325 MG/1
650 TABLET ORAL EVERY 4 HOURS PRN
Status: DISCONTINUED | OUTPATIENT
Start: 2023-11-20 | End: 2023-11-21 | Stop reason: HOSPADM

## 2023-11-20 RX ORDER — SODIUM CHLORIDE 9 MG/ML
40 INJECTION, SOLUTION INTRAVENOUS AS NEEDED
Status: DISCONTINUED | OUTPATIENT
Start: 2023-11-20 | End: 2023-11-21 | Stop reason: HOSPADM

## 2023-11-20 RX ORDER — BISACODYL 5 MG/1
5 TABLET, DELAYED RELEASE ORAL DAILY PRN
Status: DISCONTINUED | OUTPATIENT
Start: 2023-11-20 | End: 2023-11-21 | Stop reason: HOSPADM

## 2023-11-20 RX ORDER — ONDANSETRON 2 MG/ML
4 INJECTION INTRAMUSCULAR; INTRAVENOUS EVERY 6 HOURS PRN
Status: DISCONTINUED | OUTPATIENT
Start: 2023-11-20 | End: 2023-11-21 | Stop reason: HOSPADM

## 2023-11-20 RX ORDER — FLUOXETINE HYDROCHLORIDE 20 MG/1
20 CAPSULE ORAL NIGHTLY
Status: DISCONTINUED | OUTPATIENT
Start: 2023-11-20 | End: 2023-11-21 | Stop reason: HOSPADM

## 2023-11-20 RX ORDER — CHOLECALCIFEROL (VITAMIN D3) 125 MCG
5 CAPSULE ORAL NIGHTLY PRN
Status: DISCONTINUED | OUTPATIENT
Start: 2023-11-20 | End: 2023-11-21 | Stop reason: HOSPADM

## 2023-11-20 RX ORDER — POLYETHYLENE GLYCOL 3350 17 G/17G
17 POWDER, FOR SOLUTION ORAL DAILY PRN
Status: DISCONTINUED | OUTPATIENT
Start: 2023-11-20 | End: 2023-11-21 | Stop reason: HOSPADM

## 2023-11-20 RX ORDER — ASPIRIN 325 MG
325 TABLET ORAL ONCE
Status: COMPLETED | OUTPATIENT
Start: 2023-11-20 | End: 2023-11-20

## 2023-11-20 RX ORDER — BISACODYL 10 MG
10 SUPPOSITORY, RECTAL RECTAL DAILY PRN
Status: DISCONTINUED | OUTPATIENT
Start: 2023-11-20 | End: 2023-11-21 | Stop reason: HOSPADM

## 2023-11-20 RX ORDER — ONDANSETRON 2 MG/ML
4 INJECTION INTRAMUSCULAR; INTRAVENOUS EVERY 6 HOURS PRN
Status: DISCONTINUED | OUTPATIENT
Start: 2023-11-20 | End: 2023-11-20

## 2023-11-20 RX ORDER — SODIUM CHLORIDE 0.9 % (FLUSH) 0.9 %
10 SYRINGE (ML) INJECTION AS NEEDED
Status: DISCONTINUED | OUTPATIENT
Start: 2023-11-20 | End: 2023-11-21 | Stop reason: HOSPADM

## 2023-11-20 RX ORDER — SODIUM CHLORIDE 0.9 % (FLUSH) 0.9 %
10 SYRINGE (ML) INJECTION EVERY 12 HOURS SCHEDULED
Status: DISCONTINUED | OUTPATIENT
Start: 2023-11-20 | End: 2023-11-21 | Stop reason: HOSPADM

## 2023-11-20 RX ORDER — ONDANSETRON 2 MG/ML
4 INJECTION INTRAMUSCULAR; INTRAVENOUS ONCE
Status: COMPLETED | OUTPATIENT
Start: 2023-11-20 | End: 2023-11-20

## 2023-11-20 RX ORDER — ACETAMINOPHEN 325 MG/1
650 TABLET ORAL EVERY 4 HOURS PRN
Status: DISCONTINUED | OUTPATIENT
Start: 2023-11-20 | End: 2023-11-20 | Stop reason: SDUPTHER

## 2023-11-20 RX ORDER — LIDOCAINE HYDROCHLORIDE 20 MG/ML
INJECTION, SOLUTION INFILTRATION; PERINEURAL
Status: COMPLETED | OUTPATIENT
Start: 2023-11-20 | End: 2023-11-20

## 2023-11-20 RX ORDER — AMOXICILLIN 250 MG
2 CAPSULE ORAL 2 TIMES DAILY
Status: DISCONTINUED | OUTPATIENT
Start: 2023-11-20 | End: 2023-11-21 | Stop reason: HOSPADM

## 2023-11-20 RX ORDER — MECLIZINE HYDROCHLORIDE 25 MG/1
25 TABLET ORAL ONCE
Status: COMPLETED | OUTPATIENT
Start: 2023-11-20 | End: 2023-11-20

## 2023-11-20 RX ORDER — MECLIZINE HYDROCHLORIDE 25 MG/1
25 TABLET ORAL 3 TIMES DAILY PRN
Status: DISCONTINUED | OUTPATIENT
Start: 2023-11-20 | End: 2023-11-21 | Stop reason: HOSPADM

## 2023-11-20 RX ORDER — ASPIRIN 81 MG/1
324 TABLET, CHEWABLE ORAL ONCE
Status: DISCONTINUED | OUTPATIENT
Start: 2023-11-20 | End: 2023-11-20 | Stop reason: SDUPTHER

## 2023-11-20 RX ORDER — ENOXAPARIN SODIUM 100 MG/ML
40 INJECTION SUBCUTANEOUS EVERY 12 HOURS
Status: DISCONTINUED | OUTPATIENT
Start: 2023-11-20 | End: 2023-11-20

## 2023-11-20 RX ORDER — NITROGLYCERIN 0.4 MG/1
0.4 TABLET SUBLINGUAL
Status: DISCONTINUED | OUTPATIENT
Start: 2023-11-20 | End: 2023-11-21 | Stop reason: HOSPADM

## 2023-11-20 RX ORDER — PANTOPRAZOLE SODIUM 40 MG/1
40 TABLET, DELAYED RELEASE ORAL NIGHTLY
Status: DISCONTINUED | OUTPATIENT
Start: 2023-11-20 | End: 2023-11-21 | Stop reason: HOSPADM

## 2023-11-20 RX ORDER — ONDANSETRON 4 MG/1
4 TABLET, FILM COATED ORAL EVERY 6 HOURS PRN
Status: DISCONTINUED | OUTPATIENT
Start: 2023-11-20 | End: 2023-11-21 | Stop reason: HOSPADM

## 2023-11-20 RX ORDER — ATENOLOL 25 MG/1
25 TABLET ORAL NIGHTLY
Status: DISCONTINUED | OUTPATIENT
Start: 2023-11-20 | End: 2023-11-21 | Stop reason: HOSPADM

## 2023-11-20 RX ADMIN — Medication 10 ML: at 09:42

## 2023-11-20 RX ADMIN — ASPIRIN 325 MG ORAL TABLET 325 MG: 325 PILL ORAL at 00:46

## 2023-11-20 RX ADMIN — MECLIZINE HYDROCHLORIDE 25 MG: 25 TABLET ORAL at 14:09

## 2023-11-20 RX ADMIN — LIDOCAINE HYDROCHLORIDE 10 ML: 20 INJECTION, SOLUTION INFILTRATION; PERINEURAL at 16:16

## 2023-11-20 RX ADMIN — PANTOPRAZOLE SODIUM 40 MG: 40 TABLET, DELAYED RELEASE ORAL at 22:06

## 2023-11-20 RX ADMIN — ONDANSETRON 4 MG: 2 INJECTION INTRAMUSCULAR; INTRAVENOUS at 00:46

## 2023-11-20 RX ADMIN — MECLIZINE HYDROCHLORIDE 25 MG: 25 TABLET ORAL at 01:26

## 2023-11-20 RX ADMIN — Medication 10 ML: at 22:07

## 2023-11-20 RX ADMIN — ATENOLOL 25 MG: 25 TABLET ORAL at 22:07

## 2023-11-20 RX ADMIN — FLUOXETINE 20 MG: 20 CAPSULE ORAL at 22:06

## 2023-11-20 NOTE — PLAN OF CARE
Goal Outcome Evaluation:      A/O x4 no C/O dizziness this am. Cardiology consulted

## 2023-11-20 NOTE — PLAN OF CARE
Problem: Adult Inpatient Plan of Care  Goal: Plan of Care Review  Outcome: Ongoing, Not Progressing  Flowsheets (Taken 11/20/2023 0700)  Progress: no change  Plan of Care Reviewed With: patient  Outcome Evaluation: NPO for cardiology consult  Goal: Patient-Specific Goal (Individualized)  Outcome: Ongoing, Not Progressing  Goal: Absence of Hospital-Acquired Illness or Injury  Outcome: Ongoing, Not Progressing  Goal: Optimal Comfort and Wellbeing  Outcome: Ongoing, Not Progressing  Goal: Readiness for Transition of Care  Outcome: Ongoing, Not Progressing  Intervention: Mutually Develop Transition Plan  Recent Flowsheet Documentation  Taken 11/20/2023 0753 by Romy Mann, RN  Transportation Anticipated: family or friend will provide  Patient/Family Anticipated Services at Transition: none  Patient/Family Anticipates Transition to: home with family  Taken 11/20/2023 0705 by Romy Mann, RN  Equipment Currently Used at Home: none     Problem: Syncope  Goal: Absence of Syncopal Symptoms  Outcome: Ongoing, Not Progressing     Problem: Chest Pain  Goal: Resolution of Chest Pain Symptoms  Outcome: Ongoing, Not Progressing   Goal Outcome Evaluation:  Plan of Care Reviewed With: patient        Progress: no change  Outcome Evaluation: NPO for cardiology consult

## 2023-11-20 NOTE — CASE MANAGEMENT/SOCIAL WORK
Discharge Planning Assessment   Abdirashid     Patient Name: Jazmine Read  MRN: [id number]  Today's Date: 11/20/2023    Admit Date: 11/19/2023    Plan: Home w/ spouse   Discharge Needs Assessment       Row Name 11/20/23 1245       Living Environment    People in Home spouse;parent(s)    Name(s) of People in Home  Shiraz and mother    Current Living Arrangements home    Primary Care Provided by self    Provides Primary Care For no one    Family Caregiver if Needed spouse    Family Caregiver Names  Shiraz    Quality of Family Relationships helpful;involved;supportive    Able to Return to Prior Arrangements yes       Resource/Environmental Concerns    Transportation Concerns none       Transition Planning    Patient/Family Anticipates Transition to home with family    Patient/Family Anticipated Services at Transition none    Transportation Anticipated family or friend will provide       Discharge Needs Assessment    Equipment Currently Used at Home bp cuff    Concerns to be Addressed denies needs/concerns at this time    Anticipated Changes Related to Illness none                   Discharge Plan       Row Name 11/20/23 1246       Plan    Plan Home w/ spouse    Plan Comments CM met with patient at the bedside. Confirmed PCP, insurance, and pharmacy. Patient denies any difficulty affording medications. Patient is not current with any HHC/OPPT/OT services. Patient lives at home with , is IADLS, and drives. Patient's  will take her home at discharge. DC Barriers: cardiology consult                   Demographic Summary       Row Name 11/20/23 1248       General Information    Admission Type observation    Arrived From emergency department    Referral Source admission list    Reason for Consult care coordination/care conference;discharge planning    Preferred Language English       Contact Information    Permission Granted to Share Info With     Contact Information Obtained for case  manager                   Functional Status       Row Name 11/20/23 1244       Functional Status    Usual Activity Tolerance good    Current Activity Tolerance good       Functional Status, IADL    Medications independent    Meal Preparation independent    Housekeeping independent    Laundry independent    Shopping independent                Demetrius Topete RN      Cell number [phone]  Office number [phone]

## 2023-11-20 NOTE — SIGNIFICANT NOTE
11/20/23 1147   Living Situation   Current Living Arrangements home   Potentially Unsafe Housing Conditions none   Food Insecurity   Within the past 12 months, you worried that your food would run out before you got the money to buy more. Sometimes   Within the past 12 months, the food you bought just didn't last and you didn't have money to get more. Never true   Transportation Needs   In the past 12 months, has lack of transportation kept you from medical appointments or from getting medications? no   In the past 12 months, has lack of transportation kept you from meetings, work, or from getting things needed for daily living? No   Utilities   In the past 12 months has the electric, gas, oil, or water company threatened to shut off services in your home? Yes  (electric in the past, it is on now)   Abuse Screen (yes response referral indicated)   Feels Unsafe at Home or Work/School no   Feels Threatened by Someone no   Does Anyone Try to Keep You From Having Contact with Others or Doing Things Outside Your Home? no   Physical Signs of Abuse Present no   Financial Resource Strain   How hard is it for you to pay for the very basics like food, housing, medical care, and heating? Somewhat   Employment   Do you want help finding or keeping work or a job? I do not need or want help   Family and Community Support   If for any reason you need help with day-to-day activities such as bathing, preparing meals, shopping, managing finances, etc., do you get the help you need? I get all the help I need   How often do you feel lonely or isolated from those around you? Rarely   Education   Preferred Language English   Do you want help with school or training? For example, starting or completing job training or getting a high school diploma, GED or equivalent No   Physical Activity   On average, how many days per week do you engage in moderate to strenuous exercise (like a brisk walk)? 0 days   On average, how many minutes do  you engage in exercise at this level? 0 min   Number of minutes of exercise per week (!) 0   Alcohol Use   Q1: How often do you have a drink containing alcohol? Monthly or l   Q2: How many drinks containing alcohol do you have on a typical day when you are drinking? 1 or 2   Q3: How often do you have six or more drinks on one occasion? Never   Mental Health   Little Interest or Pleasure in Doing Things 1-->several days   Feeling Down, Depressed or Hopeless 0-->not at all   Disabilities   Concentrating, Remembering or Making Decisions Difficulty no   Doing Errands Independently Difficulty (such as shopping) no

## 2023-11-20 NOTE — CONSULTS
[facility] CARDIOLOGY CONSULT  [facility]                      Cardiology assessment and plan    28 y.o. female seen in clinic today for hospital follow-up.     Patient has no prior history of ischemic heart disease.  She was hospitalized 6/17/23 recently for syncope.  2D echo 6/2023 showed an EF = 51-55% with trace TR. She was discharged home with an ambulatory heart monitor.  She had several more syncopal episodes while wearing it, which only showed sinus tachycardia.  In office orthostatic v/s were negative.  She was planned for outpatient tilt table test and outpatient neurology evaluation.  PMH includes HTN, asthma, migraine, obesity, and anxiety.     She was hospitalized again 7/2023 for recurrent syncope.  She was seen by neurology and MRI brain was normal, EEG was normal, and her Topamax was increased.  She underwent outpatient tilt table test in which patient is reported with early syncope without hypotension or bradycardia.  She demonstrated sinus tachycardia throughout testing.  She was started on atenolol.       Syncope  Recurrent syncope  Hypertension  Vertigo  Migraine headaches  Normal extended Holter monitor study  Normal thyroid function  Normal CT head  Normal MRI of the brain    Further treatment options risk benefits reviewed and discussed with patient  Plan to proceed with loop recorder placement today  Risk benefits and alternatives reviewed and discussed with patient  Likely discharge home on the current medical therapy  Follow-up in office as an outpatient      Subjective:     Encounter Date:11/19/2023      Patient ID: Jazmine Read is a 28 y.o. female.    Chief Complaint: syncope      HPI:  Jazmine Read is a 28 y.o. female known to Dr. Hatfield with a past cardiac history of recurrent syncope suspected of inappropriate sinus tachycardia and neurocardiogenic syncope.  Patient has no prior history of ischemic heart disease.  She was hospitalized  6/17/23 for syncope.  2D echo 6/2023 showed an EF = 51-55% with trace TR.  She was discharged home with an ambulatory heart monitor.  She had several more syncopal episodes while wearing it, which only showed sinus tachycardia.  In office orthostatic v/s were negative.  She was hospitalized again 7/2023 for recurrent syncope. She was seen by neurology and MRI brain was normal, EEG was normal, and her Topamax was increased.  She underwent outpatient tilt table test in which patient is reported with early syncope without hypotension or bradycardia.  She demonstrated sinus tachycardia throughout testing.  She was started on atenolol and had done well on this.  She had some c/o dizziness and palpitations in office 11/13/23 and her atenolol was increased to 50 mg PO daily.  She had recurrent syncope and presented to the ER, in which she was ruled out for acute pathology and discharged back on atenolol 25 mg PO daily.  However she continues to have syncope and returned to the ER.  Cardiology was consulted for further eval and recommendations.  PMH includes HTN, asthma, migraine, obesity, and anxiety.     Patient reports that the first syncopal episode occurred while she was sitting at her desk at work.  She had a prodrome at dizziness and headache and awoke on the floor with her coworker next to her.  She reports feeling very drowsy when she regained consciousness.  She denies incontinence or biting her tongue.  She is not noted to have had any seizure-like activity.  Patient had 2 further syncopal episodes on Thursday and Friday in which she was also seated and passed out and rolled onto the floor.  She admits transient intermittent mid to left chest pain at rest and cannot identify precipitators.    Past Medical History:   Diagnosis Date    Anxiety     Asthma     Bipolar affective disorder in full remission     Depression     Depression     Fatty liver disease, nonalcoholic     Hernia of abdominal cavity      Hypertension     Menses, irregular          Past Surgical History:   Procedure Laterality Date    ADENOIDECTOMY      DENTAL PROCEDURE      EAR TUBES      HYSTEROSCOPY      TONSILLECTOMY           Social History     Socioeconomic History    Marital status:    Tobacco Use    Smoking status: Never     Passive exposure: Never    Smokeless tobacco: Never   Vaping Use    Vaping Use: Never used   Substance and Sexual Activity    Alcohol use: Yes     Alcohol/week: 1.0 standard drink of alcohol     Types: 1 Glasses of wine per week     Comment: occ    Drug use: Never    Sexual activity: Yes     Partners: Male       History reviewed. No pertinent family history.      Allergies   Allergen Reactions    Amoxicillin Unknown - High Severity    Penicillins Hives    Shrimp Itching    Shrimp Extract Allergy Skin Test Itching    Cephalosporins Rash       Current Medications:   Scheduled Meds:atenolol, 25 mg, Oral, Nightly  enoxaparin, 40 mg, Subcutaneous, Q12H  FLUoxetine, 20 mg, Oral, Nightly  pantoprazole, 40 mg, Oral, Nightly  senna-docusate sodium, 2 tablet, Oral, BID  sodium chloride, 10 mL, Intravenous, Q12H  sodium chloride, 10 mL, Intravenous, Q12H      Continuous Infusions:Pharmacy to Dose enoxaparin (LOVENOX),         Review of Systems   Constitutional: Negative for chills, decreased appetite and malaise/fatigue.   HENT:  Negative for congestion and nosebleeds.    Eyes:  Negative for blurred vision and double vision.   Cardiovascular:  Negative for chest pain, dyspnea on exertion, irregular heartbeat, leg swelling, near-syncope, orthopnea and palpitations.   Respiratory:  Negative for cough and shortness of breath.    Hematologic/Lymphatic: Negative for adenopathy. Does not bruise/bleed easily.   Skin:  Negative for color change and rash.   Musculoskeletal:  Negative for back pain and joint pain.   Gastrointestinal:  Negative for bloating, abdominal pain, hematemesis and hematochezia.   Genitourinary:  Negative for  "flank pain and hematuria.   Neurological:  Negative for dizziness and focal weakness.   Psychiatric/Behavioral:  Negative for altered mental status. The patient does not have insomnia.      All other systems reviewed and are negative.       Objective:         /59 (BP Location: Left arm, Patient Position: Lying)   Pulse 82   Temp 98.5 °F (36.9 °C)   Resp 16   Ht 167.6 cm (65.98\")   Wt 134 kg (296 lb 1.2 oz)   LMP 11/19/2023   SpO2 98%   BMI 47.81 kg/m²       General: Well-developed in NAD.  Neuro: AAOx3. No gross deficits.  HEENT: Sclera clear, no xanthelasmas.  CV: S1S2 RRR. No murmurs or gallops.  Resp: Breathing is unlabored. Lungs CTA throughout.  GI: BS+. Abdomen soft and NTTP.  Ext: Pedal pulses are palpable. Extremities are nonedematous.  MS: moves all extremities, no weakness.  Skin: warm, dry.  Psych: calm and cooperative.            Lab Review:     Results from last 7 days   Lab Units 11/20/23 0205 11/19/23 2348 11/14/23  1357   SODIUM mmol/L 141 137 141   POTASSIUM mmol/L 4.1 4.1 4.3   CHLORIDE mmol/L 106 102 105   CO2 mmol/L 23.0 26.0 25.0   BUN mg/dL 14 13 11   CREATININE mg/dL 0.59 0.72 0.53*   GLUCOSE mg/dL 94 99 92   CALCIUM mg/dL 8.6 9.6 9.4   AST (SGOT) U/L  --  11 11   ALT (SGPT) U/L  --  13 14     Results from last 7 days   Lab Units 11/20/23 0205 11/19/23 2348   HSTROP T ng/L 12 <6     Results from last 7 days   Lab Units 11/19/23 2348 11/14/23  1357   WBC 10*3/mm3 12.60* 9.60   HEMOGLOBIN g/dL 13.3 13.3   HEMATOCRIT % 40.8 41.1   PLATELETS 10*3/mm3 399 417     Results from last 7 days   Lab Units 11/19/23 2348   INR  0.99   APTT seconds 25.9*     Results from last 7 days   Lab Units 11/19/23 2348 11/14/23  1357   MAGNESIUM mg/dL 2.0 2.0           Invalid input(s): \"LDLCALC\"      Results from last 7 days   Lab Units 11/19/23  2348 11/14/23  1357   TSH uIU/mL 2.500 1.270       Recent Radiology:  Imaging Results (Most Recent)       Procedure Component Value Units Date/Time    " CT Head Without Contrast [[id number]] Collected: 11/20/23 0351     Updated: 11/20/23 0353    Narrative:      CT HEAD WO CONTRAST    Date of Exam: 11/20/2023 3:44 AM EST    Indication: dizziness.    Comparison: Brain MRI 7/16/2022. Head CT 7/15/2023.    Technique: Axial CT images were obtained of the head without contrast administration.  Coronal reconstructions were performed.  Automated exposure control and iterative reconstruction methods were used.      Findings:  Superficial soft tissues appear within normal limits. The calvarium is intact.  Paranasal sinuses and mastoid air cells appear well aerated.  Orbits are unremarkable.  There is no acute intracranial hemorrhage.  No mass effect or midline shift.  No   abnormal extra-axial collections.  Gray-white differentiation is within normal limits.  There are no focal hypoattenuating lesions.  Ventricular size and configuration is normal for age.      Impression:      Impression:  No acute intracranial abnormality.        Electronically Signed: Davion Johnston MD    11/20/2023 3:51 AM EST    Workstation ID: [id number]    XR Chest 1 View [[id number]] Collected: 11/19/23 2347     Updated: 11/19/23 2349    Narrative:      XR CHEST 1 VW    Date of Exam: 11/19/2023 11:23 PM EST    Indication: syncope chest pain    Comparison: 7/15/2023.    Findings:  The lung volumes are low. There is no pneumothorax, pleural effusion or focal airspace consolidation. Heart size and pulmonary vasculature appear within normal limits. Regional bones appear intact.      Impression:      Impression:  Low lung volumes without acute cardiopulmonary abnormality.        Electronically Signed: Davion Johnston MD    11/19/2023 11:47 PM EST    Workstation ID: [id number]              ECHOCARDIOGRAM:    Results for orders placed during the hospital encounter of 06/16/23    Adult Transthoracic Echo Complete W/ Cont if Necessary Per Protocol    Interpretation Summary    Left ventricular systolic function is  normal. Calculated left ventricular EF = 54.9% Left ventricular ejection fraction appears to be 51 - 55%.    Estimated right ventricular systolic pressure from tricuspid regurgitation is normal (<35 mmHg).            Assessment:         Active Hospital Problems    Diagnosis  POA    **Syncope and collapse [R55]  Yes     1) Syncope with hx recurrent syncope  - CT head negative for acute findings  - troponin negative x 2; EKG shows no acute ST abnormalities  - ddimer negative  - TSH WNL  - suspected hx of inappropriate sinus tachycardia and neurocardiogenic syncope.    - 2D echo 6/2023 showed an EF = 51-55% with trace TR.   - tilt table test 7/2023 in which patient is reported with early syncope without hypotension or bradycardia.  She demonstrated sinus tachycardia throughout testing.     - on atenolol    2) hx HTN    3) asthma    4) hx migraine  - on topamax    5) hx anxiety  - recently referred to psych as outpatient           Plan:   Repeat orthostatics.  Consider loop recorder.  Recommendations per attending cardiologist.         Electronically signed by RAYMUNDO Evans, 11/20/23, 9:35 AM EST.

## 2023-11-20 NOTE — DISCHARGE INSTRUCTIONS
Remove loop recorder dressing tomorrow after 4:30 pm. Wash hands before removing dressing. Gently remove the dressing in the shower and wash the incision gently with soap and water using your clean fingertips and pat it dry with a clean towel.

## 2023-11-20 NOTE — H&P
[facility] H&P    Patient Name: Jazmine Read  : 1995  MRN: [id number]  Primary Care Physician: Deepti Mejía PA  Date of admission: 2023     Patient Care Team:  Deepti Mejía PA as PCP - General (Physician Assistant)          Subjective   History Present Illness     Chief Complaint:   Chief Complaint   Patient presents with    Chest Pain     syncope    Ms. Read is a 28 y.o.  presents to [facility] complaining of syncope      History of Present Illness    ED 23: Patient is a 28-year-old who has a history of syncope.  On Tuesday she had a syncopal episode at work.  She came here.  Dr. Seaman her cardiologist had increased her metoprolol from 25-50 on Monday.  When she was seen here on Tuesday she was asked to decrease back down to 25 mg.  However she has done this but had a few episodes of syncope since then.  These are random events.  Not positional.  She does have a follow-up ER appointment with Dr. Seaman her cardiologist on Wednesday.  She did have some chest pain in route.  Its midsternal and brief.  She has had a couple of times this week.  With these recurrent symptoms she presented here for evaluation.     Observation 23: Patient is a 32-year-old female presenting to the hospital with syncopal episode.  Patient reports syncopal episode at work.  Patient reports frequent headaches bilateral top of his head with blurry vision.  Patient reports some nausea and vomiting times but none this episode.  Patient denies chest pain or shortness of breath.  Patient does report palpitations and some dizziness when standing.  Patient states she has had outpatient work-up with cardiology in future neurology appointment in January.      Review of Systems   Constitutional: Positive for malaise/fatigue.   Eyes:  Positive for blurred vision.   Cardiovascular:  Positive for palpitations.   Respiratory: Negative.     Endocrine: Negative.    Hematologic/Lymphatic: Negative.     Skin: Negative.    Musculoskeletal: Negative.    Gastrointestinal: Negative.    Genitourinary: Negative.    Neurological:  Positive for dizziness and headaches.   Psychiatric/Behavioral: Negative.     Allergic/Immunologic: Negative.            Personal History     Past Medical History:   Past Medical History:   Diagnosis Date    Anxiety     Asthma     Bipolar affective disorder in full remission     Depression     Depression     Fatty liver disease, nonalcoholic     Hernia of abdominal cavity     Hypertension     Menses, irregular        Surgical History:      Past Surgical History:   Procedure Laterality Date    ADENOIDECTOMY      DENTAL PROCEDURE      EAR TUBES      HYSTEROSCOPY      TONSILLECTOMY             Family History: family history is not on file. Otherwise pertinent FHx was reviewed and unremarkable.     Social History:  reports that she has never smoked. She has never been exposed to tobacco smoke. She has never used smokeless tobacco. She reports current alcohol use of about 1.0 standard drink of alcohol per week. She reports that she does not use drugs.      Medications:  Prior to Admission medications    Medication Sig Start Date End Date Taking? Authorizing Provider   atenolol (TENORMIN) 50 MG tablet Take 1 tablet by mouth Daily.  Patient taking differently: Take 0.5 tablets by mouth Daily. 11/13/23  Yes Peyman Hatfield MD   FLUoxetine (PROzac) 20 MG tablet Take 1 tablet by mouth Daily. 7/2/23  Yes ProviderBassam MD   pantoprazole (PROTONIX) 40 MG EC tablet Take 1 tablet by mouth Daily.   Yes ProviderBassam MD       Allergies:    Allergies   Allergen Reactions    Amoxicillin Unknown - High Severity    Penicillins Hives    Shrimp Itching    Shrimp Extract Allergy Skin Test Itching    Cephalosporins Rash       Objective   Objective     Vital Signs  Temp:  [98.5 °F (36.9 °C)] 98.5 °F (36.9 °C)  Heart Rate:  [77-98] 85  Resp:  [16-18] 18  BP: (106-164)/() 164/116  SpO2:  [95  %-98 %] 98 %  on   ;   Device (Oxygen Therapy): room air  Body mass index is 47.81 kg/m².    Physical Exam  Vitals and nursing note reviewed.   Constitutional:       Appearance: Normal appearance.   HENT:      Head: Normocephalic and atraumatic.      Right Ear: External ear normal.      Left Ear: External ear normal.      Nose: Nose normal.      Mouth/Throat:      Pharynx: Oropharynx is clear.   Eyes:      Extraocular Movements: Extraocular movements intact.      Conjunctiva/sclera: Conjunctivae normal.      Pupils: Pupils are equal, round, and reactive to light.   Cardiovascular:      Rate and Rhythm: Normal rate and regular rhythm.      Pulses: Normal pulses.      Heart sounds: Normal heart sounds.   Pulmonary:      Effort: Pulmonary effort is normal.      Breath sounds: Normal breath sounds.   Abdominal:      General: Bowel sounds are normal.      Palpations: Abdomen is soft.   Musculoskeletal:         General: Normal range of motion.      Cervical back: Normal range of motion.   Skin:     General: Skin is warm.      Capillary Refill: Capillary refill takes less than 2 seconds.   Neurological:      Mental Status: She is alert and oriented to person, place, and time.      Cranial Nerves: Cranial nerves 2-12 are intact.      Sensory: Sensation is intact.      Motor: Weakness present.   Psychiatric:         Mood and Affect: Mood normal.         Behavior: Behavior normal.         Thought Content: Thought content normal.         Judgment: Judgment normal.           Results Review:  I have personally reviewed most recent cardiac tracings, lab results, microbiology results, and radiology images and interpretations and agree with findings, most notably: CBC, CMP, CT head, urinalysis.    Results from last 7 days   Lab Units 11/19/23  2348   WBC 10*3/mm3 12.60*   HEMOGLOBIN g/dL 13.3   HEMATOCRIT % 40.8   PLATELETS 10*3/mm3 399   INR  0.99     Results from last 7 days   Lab Units 11/20/23  0205 11/19/23  2348   SODIUM  mmol/L 141 137   POTASSIUM mmol/L 4.1 4.1   CHLORIDE mmol/L 106 102   CO2 mmol/L 23.0 26.0   BUN mg/dL 14 13   CREATININE mg/dL 0.59 0.72   GLUCOSE mg/dL 94 99   CALCIUM mg/dL 8.6 9.6   ALK PHOS U/L  --  55   ALT (SGPT) U/L  --  13   AST (SGOT) U/L  --  11   HSTROP T ng/L 12 <6     Estimated Creatinine Clearance: 201.7 mL/min (by C-G formula based on SCr of 0.59 mg/dL).  Brief Urine Lab Results  (Last result in the past 365 days)        Color   Clarity   Blood   Leuk Est   Nitrite   Protein   CREAT   Urine HCG        11/20/23 0808 Yellow   Clear   Small (1+)   Negative   Negative   Negative                   Microbiology Results (last 10 days)       ** No results found for the last 240 hours. **            ECG/EMG Results (most recent)       Procedure Component Value Units Date/Time    ECG 12 Lead Chest Pain [[id number]] Collected: 11/19/23 2228     Updated: 11/20/23 0809     QT Interval 334 ms      QTC Interval 424 ms     Narrative:      HEART RATE= 96  bpm  RR Interval= 620  ms  CT Interval= 138  ms  P Horizontal Axis= 24  deg  P Front Axis= 23  deg  QRSD Interval= 74  ms  QT Interval= 334  ms  QTcB= 424  ms  QRS Axis= 23  deg  T Wave Axis= 32  deg  - NORMAL ECG -  Sinus rhythm  Electronically Signed By: Colt Keane ([place]) 20-Nov-2023 08:09:33  Date and Time of Study: 2023-11-19 22:28:07                Results for orders placed during the hospital encounter of 06/16/23    Adult Transthoracic Echo Complete W/ Cont if Necessary Per Protocol    Interpretation Summary    Left ventricular systolic function is normal. Calculated left ventricular EF = 54.9% Left ventricular ejection fraction appears to be 51 - 55%.    Estimated right ventricular systolic pressure from tricuspid regurgitation is normal (<35 mmHg).      CT Head Without Contrast    Result Date: 11/20/2023  Impression: No acute intracranial abnormality. Electronically Signed: Davion Johnston MD  11/20/2023 3:51 AM EST  Workstation ID: [id number]    XR Chest 1  View    Result Date: 11/19/2023  Impression: Low lung volumes without acute cardiopulmonary abnormality. Electronically Signed: Davion Johnston MD  11/19/2023 11:47 PM EST  Workstation ID: [id number]       Estimated Creatinine Clearance: 201.7 mL/min (by C-G formula based on SCr of 0.59 mg/dL).    Assessment & Plan   Assessment/Plan       Active Hospital Problems    Diagnosis  POA    **Syncope and collapse [R55]  Yes      Resolved Hospital Problems   No resolved problems to display.     Syncope  - Thyroid, d-dimer, troponin, CMP unremarkable  - hCG negative  -Urinalysis shows trace ketones, small blood, trace bacteria, squamous epithelial cell; urine culture indicated  -Continue IV fluids  -CT head showed no acute intracranial abnormality  -Chest x-ray showed low lung volumes acute cardiopulmonary disease seen  -EKG shows sinus rhythm without acute ST changes  -EEG July 2023 showed no seizure activity  -Cardiology consulted    Major Depression Disorder  -Continue fluoxetine    History of migraines without aura  -Continue Topamax     GERD  -Continue Protonix     Obesity (BMI 47.81)  - healthy lifestyle education        VTE Prophylaxis -   Mechanical Order History:        Ordered        11/20/23 0715  Place Sequential Compression Device  Once            11/20/23 0715  Maintain Sequential Compression Device  Continuous            11/20/23 0438  Place Sequential Compression Device  Once            11/20/23 0438  Maintain Sequential Compression Device  Continuous                          Pharmalogical Order History:        Ordered     Dose Route Frequency Stop    11/20/23 0451  Enoxaparin Sodium (LOVENOX) syringe 40 mg         40 mg SC Every 12 Hours --    11/20/23 0438  Pharmacy to Dose enoxaparin (LOVENOX)        Question:  Indication of use  Answer:  Prophylaxis    -- XX Continuous PRN --                    CODE STATUS:    Code Status and Medical Interventions:   Ordered at: 11/20/23 0715     Level Of Support Discussed  With:    Patient     Code Status (Patient has no pulse and is not breathing):    CPR (Attempt to Resuscitate)     Medical Interventions (Patient has pulse or is breathing):    Full Support       This patient has been examined wearing personal protective equipment.     I discussed the patient's findings and my recommendations with patient, family, nursing staff, primary care team, and consulting provider.      Signature:Electronically signed by RAYMUNDO Ritchie, 11/20/23, 11:14 AM EST.          I spent 35 minutes caring for Jazmine on this date of service. This time includes time spent by me in the following activities: reviewing tests, obtaining and/or reviewing a separately obtained history, performing a medically appropriate examination and/or evaluation, counseling and educating the patient/family/caregiver, ordering medications, tests, or procedures, referring and communicating with other health care professionals, documenting information in the medical record, independently interpreting results and communicating that information with the patient/family/caregiver, and care coordination.

## 2023-11-20 NOTE — ED PROVIDER NOTES
Subjective   History of Present Illness  Chief complaint: Patient is a 28-year-old who has a history of syncope.  On Tuesday she had a syncopal episode at work.  She came here.  Dr. Seaman her cardiologist had increased her metoprolol from 25-50 on Monday.  When she was seen here on Tuesday she was asked to decrease back down to 25 mg.  However she has done this but had a few episodes of syncope since then.  These are random events.  Not positional.  She does have a follow-up ER appointment with Dr. Seaman her cardiologist on Wednesday.  She did have some chest pain in route.  Its midsternal and brief.  She has had a couple of times this week.  With these recurrent symptoms she presented here for evaluation.    Context:    Duration:    Timing:    Severity:    Associated Symptoms:        PCP:  LMP:      Review of Systems   Constitutional: Negative.    HENT: Negative.     Respiratory: Negative.     Cardiovascular:  Positive for chest pain.   Gastrointestinal: Negative.    Genitourinary: Negative.    Musculoskeletal: Negative.    Neurological:  Positive for syncope.       Past Medical History:   Diagnosis Date    Anxiety     Asthma     Bipolar affective disorder in full remission     Depression     Depression     Fatty liver disease, nonalcoholic     Hernia of abdominal cavity     Hypertension     Menses, irregular        Allergies   Allergen Reactions    Amoxicillin Unknown - High Severity    Penicillins Hives    Shrimp Itching    Shrimp Extract Allergy Skin Test Itching    Cephalosporins Rash       Past Surgical History:   Procedure Laterality Date    ADENOIDECTOMY      DENTAL PROCEDURE      EAR TUBES      HYSTEROSCOPY      TONSILLECTOMY         No family history on file.    Social History     Socioeconomic History    Marital status:    Tobacco Use    Smoking status: Never     Passive exposure: Never    Smokeless tobacco: Never   Vaping Use    Vaping Use: Never used   Substance and Sexual Activity    Alcohol use:  Yes     Alcohol/week: 1.0 standard drink of alcohol     Types: 1 Glasses of wine per week     Comment: occ    Drug use: Never    Sexual activity: Yes     Partners: Male           Objective   Physical Exam  Vitals and nursing note reviewed.   Constitutional:       Appearance: She is obese.   HENT:      Head: Normocephalic and atraumatic.   Cardiovascular:      Rate and Rhythm: Normal rate and regular rhythm.      Heart sounds: Normal heart sounds.   Pulmonary:      Effort: Pulmonary effort is normal.      Breath sounds: Normal breath sounds.   Abdominal:      Palpations: Abdomen is soft.      Tenderness: There is no abdominal tenderness.   Musculoskeletal:      Cervical back: Normal range of motion.      Right lower leg: No tenderness.      Left lower leg: No tenderness.   Skin:     General: Skin is warm and dry.   Neurological:      General: No focal deficit present.      Mental Status: She is alert and oriented to person, place, and time.   Psychiatric:         Mood and Affect: Mood normal.         Behavior: Behavior normal.         Procedures           ED Course  ED Course as of 11/20/23 0418   Mon Nov 20, 2023   0103 Review of July's M cot shows episodes of sinus tachycardia.  No other findings. [LH]      ED Course User Index  [LH] Jose Adams DO      Results for orders placed or performed during the hospital encounter of 11/19/23   Comprehensive Metabolic Panel    Specimen: Blood   Result Value Ref Range    Glucose 99 65 - 99 mg/dL    BUN 13 6 - 20 mg/dL    Creatinine 0.72 0.57 - 1.00 mg/dL    Sodium 137 136 - 145 mmol/L    Potassium 4.1 3.5 - 5.2 mmol/L    Chloride 102 98 - 107 mmol/L    CO2 26.0 22.0 - 29.0 mmol/L    Calcium 9.6 8.6 - 10.5 mg/dL    Total Protein 7.0 6.0 - 8.5 g/dL    Albumin 4.0 3.5 - 5.2 g/dL    ALT (SGPT) 13 1 - 33 U/L    AST (SGOT) 11 1 - 32 U/L    Alkaline Phosphatase 55 39 - 117 U/L    Total Bilirubin 0.2 0.0 - 1.2 mg/dL    Globulin 3.0 gm/dL    A/G Ratio 1.3 g/dL     BUN/Creatinine Ratio 18.1 7.0 - 25.0    Anion Gap 9.0 5.0 - 15.0 mmol/L    eGFR 117.7 >60.0 mL/min/1.73   Protime-INR    Specimen: Blood   Result Value Ref Range    Protime 10.8 9.6 - 11.7 Seconds    INR 0.99 0.93 - 1.10   aPTT    Specimen: Blood   Result Value Ref Range    PTT 25.9 (L) 61.0 - 76.5 seconds   High Sensitivity Troponin T    Specimen: Blood   Result Value Ref Range    HS Troponin T <6 <14 ng/L   D-dimer, Quantitative    Specimen: Blood   Result Value Ref Range    D-Dimer, Quantitative 0.33 0.00 - 0.50 mg/L (FEU)   Magnesium    Specimen: Blood   Result Value Ref Range    Magnesium 2.0 1.6 - 2.6 mg/dL   TSH    Specimen: Blood   Result Value Ref Range    TSH 2.500 0.270 - 4.200 uIU/mL   CBC Auto Differential    Specimen: Blood   Result Value Ref Range    WBC 12.60 (H) 3.40 - 10.80 10*3/mm3    RBC 5.23 3.77 - 5.28 10*6/mm3    Hemoglobin 13.3 12.0 - 15.9 g/dL    Hematocrit 40.8 34.0 - 46.6 %    MCV 77.9 (L) 79.0 - 97.0 fL    MCH 25.4 (L) 26.6 - 33.0 pg    MCHC 32.6 31.5 - 35.7 g/dL    RDW 15.5 (H) 12.3 - 15.4 %    RDW-SD 45.1 37.0 - 54.0 fl    MPV 7.9 6.0 - 12.0 fL    Platelets 399 140 - 450 10*3/mm3    Neutrophil % 70.7 42.7 - 76.0 %    Lymphocyte % 21.7 19.6 - 45.3 %    Monocyte % 5.3 5.0 - 12.0 %    Eosinophil % 0.8 0.3 - 6.2 %    Basophil % 1.5 0.0 - 1.5 %    Neutrophils, Absolute 8.90 (H) 1.70 - 7.00 10*3/mm3    Lymphocytes, Absolute 2.70 0.70 - 3.10 10*3/mm3    Monocytes, Absolute 0.70 0.10 - 0.90 10*3/mm3    Eosinophils, Absolute 0.10 0.00 - 0.40 10*3/mm3    Basophils, Absolute 0.20 0.00 - 0.20 10*3/mm3    nRBC 0.0 0.0 - 0.2 /100 WBC   High Sensitivity Troponin T 2Hr    Specimen: Blood   Result Value Ref Range    HS Troponin T 12 <14 ng/L    Troponin T Delta     ECG 12 Lead Chest Pain   Result Value Ref Range    QT Interval 334 ms    QTC Interval 424 ms   Gold Top - SST   Result Value Ref Range    Extra Tube Hold for add-ons.                CT Head Without Contrast    Result Date:  11/20/2023  Impression: No acute intracranial abnormality. Electronically Signed: Davion Johnston MD  11/20/2023 3:51 AM EST  Workstation ID: [id number]    XR Chest 1 View    Result Date: 11/19/2023  Impression: Low lung volumes without acute cardiopulmonary abnormality. Electronically Signed: Davion Johnston MD  11/19/2023 11:47 PM EST  Workstation ID: [id number]                              Medical Decision Making  Patient was seen evaluated for the above problem    Differential diagnosis includes but is not limited to cardiogenic syncope, orthostasis, vertigo, ACS    EKG interpreted myself sinus rhythm rate of 96.  Patient had negative cardiac marker here.  Troponin did go from 6-12 however.  Patient had a delta of 6.  Staying within normal range however.  She sees Dr. Seaman and with these multiple recurrent syncopal events will place in the ED observation unit for cardiac consultation.  She does describe it somewhat like vertigo.  I saw and negative MRI and this was back in July.  CT scan of her head today shows no intracerebral hemorrhage.  She did have some improvement with meclizine as far as her dizziness is concerned.  Patient placed in the ED observation unit.  Cardiac consultation to be obtained.  Patient can also have PT evaluate for dizziness as well.    Problems Addressed:  Chest pain, unspecified type: complicated acute illness or injury  Syncope and collapse: complicated acute illness or injury    Amount and/or Complexity of Data Reviewed  Labs: ordered. Decision-making details documented in ED Course.     Details: Labs reviewed by myself  Radiology: ordered and independent interpretation performed.     Details: CT scan reviewed by myself no intracerebral hemorrhage or mass lesion.  Chest x-ray shows lung volumes to be low without acute cardiopulmonary process.  ECG/medicine tests: ordered and independent interpretation performed.     Details: Interpreted by myself as above    Risk  OTC drugs.  Prescription  drug management.  Decision regarding hospitalization.        Final diagnoses:   None   Dizziness  Syncope  Chest pain    ED Disposition  ED Disposition       None            No follow-up provider specified.       Medication List      No changes were made to your prescriptions during this visit.            Jose Adams DO  11/20/23 0421

## 2023-11-21 ENCOUNTER — TELEPHONE (OUTPATIENT)
Dept: CARDIOLOGY | Facility: CLINIC | Age: 28
End: 2023-11-21

## 2023-11-21 VITALS
OXYGEN SATURATION: 93 % | WEIGHT: 293 LBS | TEMPERATURE: 98.4 F | HEART RATE: 82 BPM | DIASTOLIC BLOOD PRESSURE: 70 MMHG | BODY MASS INDEX: 47.09 KG/M2 | SYSTOLIC BLOOD PRESSURE: 135 MMHG | RESPIRATION RATE: 16 BRPM | HEIGHT: 66 IN

## 2023-11-21 PROCEDURE — G0378 HOSPITAL OBSERVATION PER HR: HCPCS

## 2023-11-21 RX ORDER — ATENOLOL 50 MG/1
25 TABLET ORAL DAILY
Qty: 30 TABLET | Refills: 0 | Status: SHIPPED | OUTPATIENT
Start: 2023-11-21

## 2023-11-21 RX ADMIN — Medication 10 ML: at 08:40

## 2023-11-21 RX ADMIN — ACETAMINOPHEN 650 MG: 325 TABLET, FILM COATED ORAL at 05:16

## 2023-11-21 RX ADMIN — ACETAMINOPHEN 650 MG: 325 TABLET, FILM COATED ORAL at 09:04

## 2023-11-21 NOTE — DISCHARGE SUMMARY
[facility]    Deepti Mejía PA    CHIEF COMPLAINT:     Syncope     HISTORY OF PRESENT ILLNESS:    [facility]  ED 11/19/23: Patient is a 28-year-old who has a history of syncope.  On Tuesday she had a syncopal episode at work.  She came here.  Dr. Seaman her cardiologist had increased her metoprolol from 25-50 on Monday.  When she was seen here on Tuesday she was asked to decrease back down to 25 mg.  However she has done this but had a few episodes of syncope since then.  These are random events.  Not positional.  She does have a follow-up ER appointment with Dr. Seaman her cardiologist on Wednesday.  She did have some chest pain in route.  Its midsternal and brief.  She has had a couple of times this week.  With these recurrent symptoms she presented here for evaluation.      Observation 11/20/23: Patient is a 32-year-old female presenting to the hospital with syncopal episode.  Patient reports syncopal episode at work.  Patient reports frequent headaches bilateral top of his head with blurry vision.  Patient reports some nausea and vomiting times but none this episode.  Patient denies chest pain or shortness of breath.  Patient does report palpitations and some dizziness when standing.  Patient states she has had outpatient work-up with cardiology in future neurology appointment in January.    Past Medical History:   Diagnosis Date    Anxiety     Asthma     Bipolar affective disorder in full remission     Depression     Depression     Fatty liver disease, nonalcoholic     Hernia of abdominal cavity     Hypertension     Menses, irregular      Past Surgical History:   Procedure Laterality Date    ADENOIDECTOMY      DENTAL PROCEDURE      EAR TUBES      HYSTEROSCOPY      TONSILLECTOMY       History reviewed. No pertinent family history.  Social History     Tobacco Use    Smoking status: Never     Passive exposure: Never    Smokeless tobacco: Never   Vaping Use    Vaping Use: Never used   Substance Use  Topics    Alcohol use: Yes     Alcohol/week: 1.0 standard drink of alcohol     Types: 1 Glasses of wine per week     Comment: occ    Drug use: Never     Medications Prior to Admission   Medication Sig Dispense Refill Last Dose    FLUoxetine (PROzac) 20 MG tablet Take 1 tablet by mouth Daily.   11/19/2023 at 2100    pantoprazole (PROTONIX) 40 MG EC tablet Take 1 tablet by mouth Daily.   11/19/2023 at 2100    [DISCONTINUED] atenolol (TENORMIN) 50 MG tablet Take 1 tablet by mouth Daily. (Patient taking differently: Take 0.5 tablets by mouth Daily.) 90 tablet 3 11/19/2023 at 2100     Allergies:  Amoxicillin, Penicillins, Shrimp, Shrimp extract allergy skin test, and Cephalosporins      There is no immunization history on file for this patient.        REVIEW OF SYSTEMS:    Review of Systems   Constitutional: Positive for malaise/fatigue.   Eyes: Negative.    Cardiovascular:  Positive for syncope.   Respiratory: Negative.     Endocrine: Negative.    Hematologic/Lymphatic: Negative.    Skin: Negative.    Musculoskeletal: Negative.    Gastrointestinal: Negative.    Neurological:  Positive for headaches and light-headedness.   Psychiatric/Behavioral: Negative.     Allergic/Immunologic: Negative.        Vital Signs  Temp:  [97.8 °F (36.6 °C)-98.5 °F (36.9 °C)] 98.4 °F (36.9 °C)  Heart Rate:  [77-90] 82  Resp:  [16-19] 16  BP: (106-164)/() 135/70          Physical Exam:  Physical Exam  Vitals reviewed.   Constitutional:       Appearance: Normal appearance. She is obese.   HENT:      Head: Normocephalic and atraumatic.      Right Ear: External ear normal.      Left Ear: External ear normal.      Nose: Nose normal.      Mouth/Throat:      Pharynx: Oropharynx is clear.   Eyes:      Extraocular Movements: Extraocular movements intact.      Conjunctiva/sclera: Conjunctivae normal.      Pupils: Pupils are equal, round, and reactive to light.   Cardiovascular:      Rate and Rhythm: Normal rate and regular rhythm.      Pulses:  Normal pulses.      Heart sounds: Normal heart sounds.   Pulmonary:      Effort: Pulmonary effort is normal.      Breath sounds: Normal breath sounds.   Abdominal:      General: Bowel sounds are normal.      Palpations: Abdomen is soft.   Musculoskeletal:         General: Normal range of motion.      Cervical back: Normal range of motion.   Skin:     General: Skin is warm.      Capillary Refill: Capillary refill takes less than 2 seconds.   Neurological:      Mental Status: She is alert and oriented to person, place, and time.   Psychiatric:         Mood and Affect: Mood normal.         Behavior: Behavior normal.         Thought Content: Thought content normal.         Judgment: Judgment normal.         Emotional Behavior:    WNL   Debilities:   none  Results Review:    I reviewed the patient's new clinical results.  Lab Results (most recent)       Procedure Component Value Units Date/Time    T3, Free [[id number]]  (Normal) Collected: 11/20/23 0205    Specimen: Blood Updated: 11/20/23 1648     T3, Free 3.35 pg/mL     Narrative:      Results may be falsely increased if patient taking Biotin.      T4, Free [[id number]]  (Normal) Collected: 11/20/23 0205    Specimen: Blood Updated: 11/20/23 1021     Free T4 1.26 ng/dL     Narrative:      Results may be falsely increased if patient taking Biotin.      Urinalysis With Culture If Indicated - Urine, Clean Catch [[id number]]  (Abnormal) Collected: 11/20/23 0808    Specimen: Urine, Clean Catch Updated: 11/20/23 0821     Color, UA Yellow     Appearance, UA Clear     pH, UA 6.0     Specific Gravity, UA 1.023     Glucose, UA Negative     Ketones, UA Trace     Bilirubin, UA Negative     Blood, UA Small (1+)     Protein, UA Negative     Leuk Esterase, UA Negative     Nitrite, UA Negative     Urobilinogen, UA 0.2 E.U./dL    Narrative:      In absence of clinical symptoms, the presence of pyuria, bacteria, and/or nitrites on the urinalysis result does not correlate with infection.     Urinalysis, Microscopic Only - Urine, Clean Catch [[id number]]  (Abnormal) Collected: 11/20/23 0808    Specimen: Urine, Clean Catch Updated: 11/20/23 0821     RBC, UA 0-2 /HPF      WBC, UA 0-2 /HPF      Comment: Urine culture not indicated.        Bacteria, UA Trace /HPF      Squamous Epithelial Cells, UA 3-6 /HPF      Hyaline Casts, UA None Seen /LPF      Methodology Automated Microscopy    Basic Metabolic Panel [[id number]]  (Normal) Collected: 11/20/23 0205    Specimen: Blood Updated: 11/20/23 0714     Glucose 94 mg/dL      BUN 14 mg/dL      Creatinine 0.59 mg/dL      Sodium 141 mmol/L      Potassium 4.1 mmol/L      Chloride 106 mmol/L      CO2 23.0 mmol/L      Calcium 8.6 mg/dL      BUN/Creatinine Ratio 23.7     Anion Gap 12.0 mmol/L      eGFR 126.9 mL/min/1.73     Narrative:      GFR Normal >60  Chronic Kidney Disease <60  Kidney Failure <15      High Sensitivity Troponin T 2Hr [[id number]] Collected: 11/20/23 0205    Specimen: Blood Updated: 11/20/23 0230     HS Troponin T 12 ng/L      Troponin T Delta --     Comment: Unable to calculate.       Narrative:      High Sensitive Troponin T Reference Range:  <14.0 ng/L- Negative Female for AMI  <22.0 ng/L- Negative Male for AMI  >=14 - Abnormal Female indicating possible myocardial injury.  >=22 - Abnormal Male indicating possible myocardial injury.   Clinicians would have to utilize clinical acumen, EKG, Troponin, and serial changes to determine if it is an Acute Myocardial Infarction or myocardial injury due to an underlying chronic condition.         Extra Tubes [[id number]] Collected: 11/19/23 2348    Specimen: Blood, Venous Line Updated: 11/20/23 0100    Narrative:      The following orders were created for panel order Extra Tubes.  Procedure                               Abnormality         Status                     ---------                               -----------         ------                     Gold Top - [facility][[id number]]                                    Final result                 Please view results for these tests on the individual orders.    Gold Top - SST [[id number]] Collected: 11/19/23 2348    Specimen: Blood Updated: 11/20/23 0100     Extra Tube Hold for add-ons.     Comment: Auto resulted.       High Sensitivity Troponin T [[id number]]  (Normal) Collected: 11/19/23 2348    Specimen: Blood Updated: 11/20/23 0027     HS Troponin T <6 ng/L     Narrative:      High Sensitive Troponin T Reference Range:  <14.0 ng/L- Negative Female for AMI  <22.0 ng/L- Negative Male for AMI  >=14 - Abnormal Female indicating possible myocardial injury.  >=22 - Abnormal Male indicating possible myocardial injury.   Clinicians would have to utilize clinical acumen, EKG, Troponin, and serial changes to determine if it is an Acute Myocardial Infarction or myocardial injury due to an underlying chronic condition.         TSH [[id number]]  (Normal) Collected: 11/19/23 2348    Specimen: Blood Updated: 11/20/23 0027     TSH 2.500 uIU/mL     Comprehensive Metabolic Panel [[id number]] Collected: 11/19/23 2348    Specimen: Blood Updated: 11/20/23 0025     Glucose 99 mg/dL      BUN 13 mg/dL      Creatinine 0.72 mg/dL      Sodium 137 mmol/L      Potassium 4.1 mmol/L      Chloride 102 mmol/L      CO2 26.0 mmol/L      Calcium 9.6 mg/dL      Total Protein 7.0 g/dL      Albumin 4.0 g/dL      ALT (SGPT) 13 U/L      AST (SGOT) 11 U/L      Alkaline Phosphatase 55 U/L      Total Bilirubin 0.2 mg/dL      Globulin 3.0 gm/dL      A/G Ratio 1.3 g/dL      BUN/Creatinine Ratio 18.1     Anion Gap 9.0 mmol/L      eGFR 117.7 mL/min/1.73     Narrative:      GFR Normal >60  Chronic Kidney Disease <60  Kidney Failure <15      Magnesium [[id number]]  (Normal) Collected: 11/19/23 2348    Specimen: Blood Updated: 11/20/23 0025     Magnesium 2.0 mg/dL     Protime-INR [[id number]]  (Normal) Collected: 11/19/23 2348    Specimen: Blood Updated: 11/20/23 0022     Protime 10.8 Seconds      INR 0.99    aPTT [[id number]]   "(Abnormal) Collected: 11/19/23 2348    Specimen: Blood Updated: 11/20/23 0022     PTT 25.9 seconds     D-dimer, Quantitative [[id number]]  (Normal) Collected: 11/19/23 2348    Specimen: Blood Updated: 11/20/23 0022     D-Dimer, Quantitative 0.33 mg/L (FEU)     Narrative:      According to the assay 's published package insert, a normal (<0.50 mg/L (FEU)) D-dimer result in conjunction with a non-high clinical probability assessment, excludes deep vein thrombosis (DVT) and pulmonary embolism (PE) with high sensitivity.    D-dimer values increase with age and this can make VTE exclusion of an older population difficult. To address this, the [employer], based on best available evidence and recent guidelines, recommends that clinicians use age-adjusted D-dimer thresholds in patients greater than 50 years of age with: a) a low probability of PE who do not meet all Pulmonary Embolism Rule Out Criteria, or b) in those with intermediate probability of PE.   The formula for an age-adjusted D-dimer cut-off is \"age/100\".  For example, a 60 year old patient would have an age-adjusted cut-off of 0.60 mg/L (FEU) and an 80 year old 0.80 mg/L (FEU).    CBC & Differential [[id number]]  (Abnormal) Collected: 11/19/23 2348    Specimen: Blood Updated: 11/19/23 2356    Narrative:      The following orders were created for panel order CBC & Differential.  Procedure                               Abnormality         Status                     ---------                               -----------         ------                     CBC Auto Differential[[id number]]        Abnormal            Final result                 Please view results for these tests on the individual orders.    CBC Auto Differential [[id number]]  (Abnormal) Collected: 11/19/23 2348    Specimen: Blood Updated: 11/19/23 2356     WBC 12.60 10*3/mm3      RBC 5.23 10*6/mm3      Hemoglobin 13.3 g/dL      Hematocrit 40.8 %      MCV 77.9 fL      MCH " 25.4 pg      MCHC 32.6 g/dL      RDW 15.5 %      RDW-SD 45.1 fl      MPV 7.9 fL      Platelets 399 10*3/mm3      Neutrophil % 70.7 %      Lymphocyte % 21.7 %      Monocyte % 5.3 %      Eosinophil % 0.8 %      Basophil % 1.5 %      Neutrophils, Absolute 8.90 10*3/mm3      Lymphocytes, Absolute 2.70 10*3/mm3      Monocytes, Absolute 0.70 10*3/mm3      Eosinophils, Absolute 0.10 10*3/mm3      Basophils, Absolute 0.20 10*3/mm3      nRBC 0.0 /100 WBC             Imaging Results (Most Recent)       Procedure Component Value Units Date/Time    CT Head Without Contrast [[id number]] Collected: 11/20/23 0351     Updated: 11/20/23 0353    Narrative:      CT HEAD WO CONTRAST    Date of Exam: 11/20/2023 3:44 AM EST    Indication: dizziness.    Comparison: Brain MRI 7/16/2022. Head CT 7/15/2023.    Technique: Axial CT images were obtained of the head without contrast administration.  Coronal reconstructions were performed.  Automated exposure control and iterative reconstruction methods were used.      Findings:  Superficial soft tissues appear within normal limits. The calvarium is intact.  Paranasal sinuses and mastoid air cells appear well aerated.  Orbits are unremarkable.  There is no acute intracranial hemorrhage.  No mass effect or midline shift.  No   abnormal extra-axial collections.  Gray-white differentiation is within normal limits.  There are no focal hypoattenuating lesions.  Ventricular size and configuration is normal for age.      Impression:      Impression:  No acute intracranial abnormality.        Electronically Signed: Davion Johnston MD    11/20/2023 3:51 AM EST    Workstation ID: [id number]    XR Chest 1 View [[id number]] Collected: 11/19/23 2347     Updated: 11/19/23 2349    Narrative:      XR CHEST 1 VW    Date of Exam: 11/19/2023 11:23 PM EST    Indication: syncope chest pain    Comparison: 7/15/2023.    Findings:  The lung volumes are low. There is no pneumothorax, pleural effusion or focal airspace  consolidation. Heart size and pulmonary vasculature appear within normal limits. Regional bones appear intact.      Impression:      Impression:  Low lung volumes without acute cardiopulmonary abnormality.        Electronically Signed: Davion Johnston MD    11/19/2023 11:47 PM EST    Workstation ID: [id number]          reviewed    ECG/EMG Results (most recent)       Procedure Component Value Units Date/Time    ECG 12 Lead Chest Pain [[id number]] Collected: 11/19/23 2228     Updated: 11/20/23 0809     QT Interval 334 ms      QTC Interval 424 ms     Narrative:      HEART RATE= 96  bpm  RR Interval= 620  ms  AZ Interval= 138  ms  P Horizontal Axis= 24  deg  P Front Axis= 23  deg  QRSD Interval= 74  ms  QT Interval= 334  ms  QTcB= 424  ms  QRS Axis= 23  deg  T Wave Axis= 32  deg  - NORMAL ECG -  Sinus rhythm  Electronically Signed By: Colt Keane ([place]) 20-Nov-2023 08:09:33  Date and Time of Study: 2023-11-19 22:28:07          reviewed        Results for orders placed during the hospital encounter of 06/16/23    Adult Transthoracic Echo Complete W/ Cont if Necessary Per Protocol    Interpretation Summary    Left ventricular systolic function is normal. Calculated left ventricular EF = 54.9% Left ventricular ejection fraction appears to be 51 - 55%.    Estimated right ventricular systolic pressure from tricuspid regurgitation is normal (<35 mmHg).      Microbiology Results (last 10 days)       ** No results found for the last 240 hours. **            Assessment & Plan     Syncope and collapse        Syncope  - Thyroid, d-dimer, troponin, CMP unremarkable  - hCG negative  -Urinalysis shows trace ketones, small blood, trace bacteria, squamous epithelial cell; urine culture indicated  -Continue IV fluids  -CT head showed no acute intracranial abnormality  -Chest x-ray showed low lung volumes acute cardiopulmonary disease seen  -EKG shows sinus rhythm without acute ST changes  -EEG July 2023 showed no seizure  activity  -Cardiology consulted     Major Depression Disorder  -Continue fluoxetine     History of migraines without aura  -Continue Topamax     GERD  -Continue Protonix     Obesity (BMI 47.81)  - healthy lifestyle education       I discussed the patients findings and my recommendations with patient and family.     Discharge Diagnosis:      Syncope and collapse      Hospital Course  Patient is a 28 y.o. female presented with syncope.  Patient has history of syncopal episodes since June 2023.  Patient has been seen by cardiology and neurology outpatient.  Patient recently had dose of medication increased.  Patient states she has some headache and dizziness continue.  hCG negative.  Thyroid, D-dimer, troponin and CMP unremarkable.  Urinalysis showed trace ketones, small blood, trace bacteria with no urine culture indicated.  Patient continued IV fluids.  CT head showed no acute intercranial abnormality.  Chest x-ray showed low lung volumes without acute cardiopulmonary disease present.  EKG shows sinus rhythm without acute ST changes.  Patient has had tilt table and EEG outpatient.  EEG showed no seizure activity.  Tilt table with reported early syncope but without hypotension or bradycardia noted.  Patient has worn cardiac monitor outpatient.  Patient seen by cardiology which further was examined with loop recorder.  Patient to follow-up with cardiology in 1 week.  Patient not to drive until follow-up with physician.  Patient take medication as prescribed.  Patient to monitor blood pressures at home.  Testing recommendations reviewed in length with patient family in agreement treatment plan.  If symptoms worsen patient call 911 or go to nearest ED.    Past Medical History:     Past Medical History:   Diagnosis Date    Anxiety     Asthma     Bipolar affective disorder in full remission     Depression     Depression     Fatty liver disease, nonalcoholic     Hernia of abdominal cavity     Hypertension     Menses,  irregular        Past Surgical History:     Past Surgical History:   Procedure Laterality Date    ADENOIDECTOMY      DENTAL PROCEDURE      EAR TUBES      HYSTEROSCOPY      TONSILLECTOMY         Social History:   Social History     Socioeconomic History    Marital status:    Tobacco Use    Smoking status: Never     Passive exposure: Never    Smokeless tobacco: Never   Vaping Use    Vaping Use: Never used   Substance and Sexual Activity    Alcohol use: Yes     Alcohol/week: 1.0 standard drink of alcohol     Types: 1 Glasses of wine per week     Comment: occ    Drug use: Never    Sexual activity: Yes     Partners: Male       Procedures Performed         Consults:   Consults       Date and Time Order Name Status Description    11/20/2023  4:38 AM Inpatient Cardiology Consult Completed             Condition on Discharge:     Stable    Discharge Disposition  Home or Self Care    Discharge Medications     Discharge Medications        Continue These Medications        Instructions Start Date   atenolol 50 MG tablet  Commonly known as: TENORMIN   25 mg, Oral, Daily      FLUoxetine 20 MG tablet  Commonly known as: PROzac   Take 1 tablet by mouth Daily.      pantoprazole 40 MG EC tablet  Commonly known as: PROTONIX   1 tablet, Oral, Daily               Discharge Diet:   Diet Instructions       Diet: Cardiac Diets; Healthy Heart (2-3 Na+); Regular Texture (IDDSI 7); Thin (IDDSI 0)      Discharge Diet: Cardiac Diets    Cardiac Diet: Healthy Heart (2-3 Na+)    Texture: Regular Texture (IDDSI 7)    Fluid Consistency: Thin (IDDSI 0)            Activity at Discharge:   Activity Instructions       Activity as Tolerated      Driving Restrictions      Type of Restriction: Driving    Driving Restrictions: No Driving Until Next Appointment    Lifting Restrictions      Type of Restriction: Lifting    Lifting Restrictions: No Lifting Until Cleared By Provider    Measure Blood Pressure      Work Restrictions      Type of Restriction:  Work    May Return to Work: After Next Appointment    With / Without Restrictions: Without Restrictions            Follow-up Appointments  Future Appointments   Date Time Provider Department Center   11/22/2023  2:30 PM Porsche Gilbert APRN [facility]   1/31/2024 10:00 AM Odalys Zheng APRN [facility]   5/13/2024  1:30 PM Peyman Hatfield MD [facility]     Additional Instructions for the Follow-ups that You Need to Schedule       Discharge Follow-up with PCP   As directed       Currently Documented PCP:    Deepti Mejía PA    PCP Phone Number:    [phone]     Follow Up Details: 7-10 days                Test Results Pending at Discharge       Risk for Readmission (LACE) Score: 6 (11/21/2023  6:00 AM)          RAYMUNDO Ritchie  11/21/23  08:21 EST        I spent 35 minutes caring for Jazmine on this date of service. This time includes time spent by me in the following activities: reviewing tests, obtaining and/or reviewing a separately obtained history, performing a medically appropriate examination and/or evaluation, counseling and educating the patient/family/caregiver, ordering medications, tests, or procedures, referring and communicating with other health care professionals, documenting information in the medical record, independently interpreting results and communicating that information with the patient/family/caregiver, and care coordination.

## 2023-11-21 NOTE — PLAN OF CARE
Goal Outcome Evaluation:           Progress: no change  Outcome Evaluation: Pt complained of some slight pain at loop insertion sight, was given pain medication, will continue to monitor

## 2023-11-21 NOTE — TELEPHONE ENCOUNTER
Caller: Jazimne Read    Relationship to patient: Self    Best call back number: [phone]    Chief complaint: SUTURE REMOVAL    Type of visit: HOSPITA;L FOLLOW UP    Requested date: 1 WEEK     If rescheduling, when is the original appointment: NONE     Additional notes:NONE

## 2023-11-21 NOTE — PROGRESS NOTES
Cardiology [facility]      Patient Care Team:  Deepti Mejía PA as PCP - General (Physician Assistant)      28 y.o. female seen in clinic today for hospital follow-up.     Patient has no prior history of ischemic heart disease.  She was hospitalized 6/17/23 recently for syncope.  2D echo 6/2023 showed an EF = 51-55% with trace TR. She was discharged home with an ambulatory heart monitor.  She had several more syncopal episodes while wearing it, which only showed sinus tachycardia.  In office orthostatic v/s were negative.  She was planned for outpatient tilt table test and outpatient neurology evaluation.  PMH includes HTN, asthma, migraine, obesity, and anxiety.     She was hospitalized again 7/2023 for recurrent syncope.  She was seen by neurology and MRI brain was normal, EEG was normal, and her Topamax was increased.  She underwent outpatient tilt table test in which patient is reported with early syncope without hypotension or bradycardia.  She demonstrated sinus tachycardia throughout testing.  She was started on atenolol.       Syncope  Recurrent syncope/likely vasovagal syncope  Hypertension  Vertigo  Migraine headaches  Normal extended Holter monitor study  Normal thyroid function  Normal CT head  Normal MRI of the brain     Denies any new complaints  Complaining of some pain at the loop recorder implantation  Tmax is 98.4 pulse is 82 respirations of 16 blood pressure is 135/70 sats are 92%  Status post loop recorder placement  Okay to discharge home and follow-up in office in 1 week  Patient is advised not to drive  Okay to stay off work for 1 week  Conservative measures reviewed and discussed with patient  Continue follow-up with neurology and psychiatry as scheduled  Follow-up in office in 1 week      Chief Complaint: Syncope and recurrent syncope    Subjective no new complaints    Interval History: No significant change in the overall status    History taken from: patient    Review of Systems:  Review of  "Systems   Constitutional: Negative for chills, decreased appetite and malaise/fatigue.   HENT:  Negative for congestion and nosebleeds.    Eyes:  Negative for blurred vision and double vision.   Cardiovascular:  Negative for chest pain, dyspnea on exertion, irregular heartbeat, leg swelling, near-syncope and orthopnea.   Respiratory:  Negative for cough and shortness of breath.    Hematologic/Lymphatic: Negative for adenopathy. Does not bruise/bleed easily.   Skin:  Negative for color change and rash.   Musculoskeletal:  Negative for back pain and joint pain.   Gastrointestinal:  Negative for bloating, abdominal pain, heartburn and hematemesis.   Genitourinary:  Negative for flank pain and hematuria.   Neurological:  Negative for dizziness and focal weakness.   Psychiatric/Behavioral:  Negative for altered mental status. The patient does not have insomnia.        Objective    Vital Signs  Visit Vitals  /70   Pulse 82   Temp 98.4 °F (36.9 °C) (Oral)   Resp 16   Ht 167.6 cm (65.98\")   Wt 134 kg (296 lb 1.2 oz)   LMP 11/19/2023   SpO2 93%   BMI 47.81 kg/m²     Oxygen Therapy  SpO2: 93 %  Pulse Oximetry Type: Intermittent  Device (Oxygen Therapy): room air  Flowsheet Rows      Flowsheet Row First Filed Value   Admission Height 167.6 cm (66\") Documented at 11/19/2023 2219   Admission Weight 140 kg (308 lb 10.3 oz) Documented at 11/19/2023 2219          Intake & Output (last 3 days)         11/18 0701  11/19 0700 11/19 0701  11/20 0700 11/20 0701  11/21 0700 11/21 0701  11/22 0700    P.O.   480     IV Piggyback  1000      Total Intake(mL/kg)  1000 (7.5) 480 (3.6)     Urine (mL/kg/hr)   250 (0.1)     Total Output   250     Net  +1000 +230                   Lines, Drains & Airways       Active LDAs       Name Placement date Placement time Site Days    Peripheral IV 11/19/23 2348 Right Antecubital 11/19/23 2348  Antecubital  1                    Physical Exam:  Constitutional:       Appearance: Well-developed.   Eyes: "      Conjunctiva/sclera: Conjunctivae normal.      Pupils: Pupils are equal, round, and reactive to light.   HENT:      Head: Normocephalic and atraumatic.   Neck:      Thyroid: No thyromegaly.   Pulmonary:      Effort: Pulmonary effort is normal.      Breath sounds: Normal breath sounds.   Cardiovascular:      Normal rate. Regular rhythm.   Pulses:     Intact distal pulses.   Edema:     Peripheral edema absent.   Abdominal:      General: Bowel sounds are normal.      Palpations: Abdomen is soft.   Musculoskeletal:      Cervical back: Normal range of motion and neck supple. Skin:     General: Skin is warm.   Neurological:      Mental Status: Alert and oriented to person, place, and time.         Results Review:     I reviewed the patient's new clinical results.    Lab Results (last 24 hours)       Procedure Component Value Units Date/Time    T3, Free [[id number]]  (Normal) Collected: 11/20/23 0205    Specimen: Blood Updated: 11/20/23 1648     T3, Free 3.35 pg/mL     Narrative:      Results may be falsely increased if patient taking Biotin.      T4, Free [[id number]]  (Normal) Collected: 11/20/23 0205    Specimen: Blood Updated: 11/20/23 1021     Free T4 1.26 ng/dL     Narrative:      Results may be falsely increased if patient taking Biotin.      Urinalysis With Culture If Indicated - Urine, Clean Catch [[id number]]  (Abnormal) Collected: 11/20/23 0808    Specimen: Urine, Clean Catch Updated: 11/20/23 0821     Color, UA Yellow     Appearance, UA Clear     pH, UA 6.0     Specific Gravity, UA 1.023     Glucose, UA Negative     Ketones, UA Trace     Bilirubin, UA Negative     Blood, UA Small (1+)     Protein, UA Negative     Leuk Esterase, UA Negative     Nitrite, UA Negative     Urobilinogen, UA 0.2 E.U./dL    Narrative:      In absence of clinical symptoms, the presence of pyuria, bacteria, and/or nitrites on the urinalysis result does not correlate with infection.    Urinalysis, Microscopic Only - Urine, Clean Catch  [[id number]]  (Abnormal) Collected: 11/20/23 0808    Specimen: Urine, Clean Catch Updated: 11/20/23 0821     RBC, UA 0-2 /HPF      WBC, UA 0-2 /HPF      Comment: Urine culture not indicated.        Bacteria, UA Trace /HPF      Squamous Epithelial Cells, UA 3-6 /HPF      Hyaline Casts, UA None Seen /LPF      Methodology Automated Microscopy          Results for orders placed during the hospital encounter of 06/16/23    Adult Transthoracic Echo Complete W/ Cont if Necessary Per Protocol    Interpretation Summary    Left ventricular systolic function is normal. Calculated left ventricular EF = 54.9% Left ventricular ejection fraction appears to be 51 - 55%.    Estimated right ventricular systolic pressure from tricuspid regurgitation is normal (<35 mmHg).        Medication Review:   I have reviewed the patient's current medication list  Scheduled Meds:atenolol, 25 mg, Oral, Nightly  FLUoxetine, 20 mg, Oral, Nightly  pantoprazole, 40 mg, Oral, Nightly  senna-docusate sodium, 2 tablet, Oral, BID  sodium chloride, 10 mL, Intravenous, Q12H  sodium chloride, 10 mL, Intravenous, Q12H      Continuous Infusions:   PRN Meds:.  acetaminophen    senna-docusate sodium **AND** polyethylene glycol **AND** bisacodyl **AND** bisacodyl    meclizine    melatonin    nitroglycerin    ondansetron **OR** ondansetron    [COMPLETED] Insert Peripheral IV **AND** sodium chloride    sodium chloride    sodium chloride    sodium chloride    sodium chloride    ECG/EMG Results (last 24 hours)       Procedure Component Value Units Date/Time    ECG 12 Lead Chest Pain [[id number]] Collected: 11/19/23 2228     Updated: 11/20/23 0809     QT Interval 334 ms      QTC Interval 424 ms     Narrative:      HEART RATE= 96  bpm  RR Interval= 620  ms  OH Interval= 138  ms  P Horizontal Axis= 24  deg  P Front Axis= 23  deg  QRSD Interval= 74  ms  QT Interval= 334  ms  QTcB= 424  ms  QRS Axis= 23  deg  T Wave Axis= 32  deg  - NORMAL ECG -  Sinus  "rhythm  Electronically Signed By: Colt Keane (VAL[place]INA) 20-Nov-2023 08:09:33  Date and Time of Study: 2023-11-19 22:28:07            Imaging Results (Last 24 Hours)       ** No results found for the last 24 hours. **          No results found for this or any previous visit.     Results for orders placed during the hospital encounter of 06/16/23    Adult Transthoracic Echo Complete W/ Cont if Necessary Per Protocol    Interpretation Summary    Left ventricular systolic function is normal. Calculated left ventricular EF = 54.9% Left ventricular ejection fraction appears to be 51 - 55%.    Estimated right ventricular systolic pressure from tricuspid regurgitation is normal (<35 mmHg).     Lab Results   Component Value Date    GLUCOSE 94 11/20/2023    BUN 14 11/20/2023    CREATININE 0.59 11/20/2023    EGFR 126.9 11/20/2023    BCR 23.7 11/20/2023    K 4.1 11/20/2023    CO2 23.0 11/20/2023    CALCIUM 8.6 11/20/2023    ALBUMIN 4.0 11/19/2023    BILITOT 0.2 11/19/2023    AST 11 11/19/2023    ALT 13 11/19/2023      No results found for: \"CHOL\", \"CHLPL\", \"TRIG\", \"HDL\", \"LDL\", \"LDLDIRECT\"   Lab Results   Component Value Date    TROPONINT 12 11/20/2023        Assessment & Plan       Syncope and collapse        Peyman Hatfield MD  11/21/23  07:18 EST               "

## 2023-11-22 ENCOUNTER — TELEPHONE (OUTPATIENT)
Dept: CARDIOLOGY | Facility: CLINIC | Age: 28
End: 2023-11-22
Payer: MEDICAID

## 2023-11-22 DIAGNOSIS — Z95.818 STATUS POST PLACEMENT OF IMPLANTABLE LOOP RECORDER: Primary | ICD-10-CM

## 2023-11-22 NOTE — TELEPHONE ENCOUNTER
Per resident on call ,pt will no longer need the accuchecks Q 4 hrs, no need for 12 MN accuchecks  Spoke to the patient- cannot give narcotics, can try Aleve or applying ice

## 2023-11-22 NOTE — TELEPHONE ENCOUNTER
Caller: Jazmine Read    Relationship: Self    Best call back number: [phone]     What medication are you requesting: PAIN MEDICATION ON DR. MIRANDA'S RECOMMENDATION; SOMETHING STRONGER THAN TYLENOL OR IPUPROFEN BECAUSE ITS NOT WORKING    What are your current symptoms: PAIN AT INCISION SITE AND INSIDE; TISSUE HURTS    How long have you been experiencing symptoms: 2 DAYS AGO; WORSE STARTING LAST NIGHT    Have you had these symptoms before:    [] Yes  [x] No    Have you been treated for these symptoms before:   [] Yes  [x] No    If a prescription is needed, what is your preferred pharmacy and phone number:  [facility] IN [place]    Additional notes:

## 2023-11-22 NOTE — TELEPHONE ENCOUNTER
Caller: Jazmine Read    Relationship: Self    Best call back number: [phone]    What is the best time to reach you: ANY    Who are you requesting to speak with (clinical staff, provider,  specific staff member): CLINICAL    Do you know the name of the person who called: PATIENT    What was the call regarding: PT CALLED IN TO LET US KNOW SHE TRIED ALEVE AND A ICE PACK AFTER SPEAKING TO MA ON THE PHONE. PT STATES SHE IS STILL IN PAIN AND THAT DID NOT WORK. PLEASE GIVE HER A CALL TO DISCUSS THIS.    Is it okay if the provider responds through MyChart: NO

## 2023-11-22 NOTE — CASE MANAGEMENT/SOCIAL WORK
Case Management Discharge Note      Final Note: Routine home         Selected Continued Care - Discharged on 11/21/2023 Admission date: 11/19/2023 - Discharge disposition: Home or Self Care         Transportation Services  Private: Car    Final Discharge Disposition Code: 01 - home or self-care

## 2024-01-21 PROCEDURE — 93298 REM INTERROG DEV EVAL SCRMS: CPT | Performed by: INTERNAL MEDICINE

## 2024-01-25 ENCOUNTER — TRANSCRIBE ORDERS (OUTPATIENT)
Dept: ADMINISTRATIVE | Facility: HOSPITAL | Age: 29
End: 2024-01-25
Payer: COMMERCIAL

## 2024-01-25 ENCOUNTER — LAB (OUTPATIENT)
Dept: LAB | Facility: HOSPITAL | Age: 29
End: 2024-01-25
Payer: COMMERCIAL

## 2024-01-25 ENCOUNTER — HOSPITAL ENCOUNTER (OUTPATIENT)
Dept: CARDIOLOGY | Facility: HOSPITAL | Age: 29
Discharge: HOME | End: 2024-01-25
Payer: COMMERCIAL

## 2024-01-25 DIAGNOSIS — Z01.818 OTHER SPECIFIED PRE-OPERATIVE EXAMINATION: ICD-10-CM

## 2024-01-25 DIAGNOSIS — Z01.818 OTHER SPECIFIED PRE-OPERATIVE EXAMINATION: Primary | ICD-10-CM

## 2024-01-25 LAB
ANION GAP SERPL CALCULATED.3IONS-SCNC: 10 MMOL/L (ref 5–15)
BACTERIA UR QL AUTO: ABNORMAL /HPF
BASOPHILS # BLD AUTO: 0 10*3/MM3 (ref 0–0.2)
BASOPHILS NFR BLD AUTO: 0.3 % (ref 0–1.5)
BILIRUB UR QL STRIP: NEGATIVE
BUN SERPL-MCNC: 14 MG/DL (ref 6–20)
BUN/CREAT SERPL: 20.9 (ref 7–25)
CALCIUM SPEC-SCNC: 9.4 MG/DL (ref 8.6–10.5)
CHLORIDE SERPL-SCNC: 104 MMOL/L (ref 98–107)
CLARITY UR: ABNORMAL
CO2 SERPL-SCNC: 25 MMOL/L (ref 22–29)
COLOR UR: YELLOW
CREAT SERPL-MCNC: 0.67 MG/DL (ref 0.57–1)
DEPRECATED RDW RBC AUTO: 43.3 FL (ref 37–54)
EGFRCR SERPLBLD CKD-EPI 2021: 123 ML/MIN/1.73
EOSINOPHIL # BLD AUTO: 0.1 10*3/MM3 (ref 0–0.4)
EOSINOPHIL NFR BLD AUTO: 1.5 % (ref 0.3–6.2)
ERYTHROCYTE [DISTWIDTH] IN BLOOD BY AUTOMATED COUNT: 14.9 % (ref 12.3–15.4)
GLUCOSE SERPL-MCNC: 99 MG/DL (ref 65–99)
GLUCOSE UR STRIP-MCNC: NEGATIVE MG/DL
HCT VFR BLD AUTO: 42.6 % (ref 34–46.6)
HGB BLD-MCNC: 13.8 G/DL (ref 12–15.9)
HGB UR QL STRIP.AUTO: ABNORMAL
HYALINE CASTS UR QL AUTO: ABNORMAL /LPF
KETONES UR QL STRIP: NEGATIVE
LEUKOCYTE ESTERASE UR QL STRIP.AUTO: ABNORMAL
LYMPHOCYTES # BLD AUTO: 2.2 10*3/MM3 (ref 0.7–3.1)
LYMPHOCYTES NFR BLD AUTO: 27.5 % (ref 19.6–45.3)
MCH RBC QN AUTO: 25.6 PG (ref 26.6–33)
MCHC RBC AUTO-ENTMCNC: 32.4 G/DL (ref 31.5–35.7)
MCV RBC AUTO: 78.8 FL (ref 79–97)
MONOCYTES # BLD AUTO: 0.5 10*3/MM3 (ref 0.1–0.9)
MONOCYTES NFR BLD AUTO: 5.8 % (ref 5–12)
NEUTROPHILS NFR BLD AUTO: 5.2 10*3/MM3 (ref 1.7–7)
NEUTROPHILS NFR BLD AUTO: 64.9 % (ref 42.7–76)
NITRITE UR QL STRIP: NEGATIVE
NRBC BLD AUTO-RTO: 0.1 /100 WBC (ref 0–0.2)
PH UR STRIP.AUTO: 7 [PH] (ref 5–8)
PLATELET # BLD AUTO: 383 10*3/MM3 (ref 140–450)
PMV BLD AUTO: 8.4 FL (ref 6–12)
POTASSIUM SERPL-SCNC: 4.1 MMOL/L (ref 3.5–5.2)
PROT UR QL STRIP: NEGATIVE
QT INTERVAL: 359 MS
QTC INTERVAL: 429 MS
RBC # BLD AUTO: 5.4 10*6/MM3 (ref 3.77–5.28)
RBC # UR STRIP: ABNORMAL /HPF
REF LAB TEST METHOD: ABNORMAL
SODIUM SERPL-SCNC: 139 MMOL/L (ref 136–145)
SP GR UR STRIP: 1.02 (ref 1–1.03)
SQUAMOUS #/AREA URNS HPF: ABNORMAL /HPF
UROBILINOGEN UR QL STRIP: ABNORMAL
WBC # UR STRIP: ABNORMAL /HPF
WBC NRBC COR # BLD AUTO: 8.1 10*3/MM3 (ref 3.4–10.8)

## 2024-01-25 PROCEDURE — 81001 URINALYSIS AUTO W/SCOPE: CPT

## 2024-01-25 PROCEDURE — 93005 ELECTROCARDIOGRAM TRACING: CPT | Performed by: OBSTETRICS & GYNECOLOGY

## 2024-01-25 PROCEDURE — 80048 BASIC METABOLIC PNL TOTAL CA: CPT

## 2024-01-25 PROCEDURE — 36415 COLL VENOUS BLD VENIPUNCTURE: CPT

## 2024-01-25 PROCEDURE — 85025 COMPLETE CBC W/AUTO DIFF WBC: CPT

## 2024-01-25 PROCEDURE — 87086 URINE CULTURE/COLONY COUNT: CPT

## 2024-01-26 LAB — BACTERIA SPEC AEROBE CULT: NO GROWTH

## 2024-01-29 NOTE — PROGRESS NOTES
Subjective: Syncope     Patient ID: Jazmine Read is a 28 y.o. female.    CHIEF COMPLAINT: Syncope     History of Present Illness Ms. Read is a very pleasant 28-year-old  female with a BMI of 50.71 who presented today with her  Shiraz for an evaluation of syncope.  Prior to this visit the patient has had extensive cardiac evaluation including a positive tilt test.  Today in the office she also had a positive orthostatic test.  These point to possible cardiac reason for her syncopal spells.  The patient also reports some decreased sensation in both feet which varies in intensity and is worse in the right foot.  This could also contribute to the patient's imbalance or syncope.  The patient and her  states there has been discussions about possible POTS disorder.  The patient states they are very frustrated and would like to get to the bottom of this disorder.    I did consult with Dr. Seiple about autonomic testing locations in the .  He reported he refers patients to the testing center at [facility] in [place].  The patient and her  are very much interested in going to the [place] autonomic center to get clear-cut answers on this disorder.  I stated that I would refer the patient.    Description of spells   Experiences dizziness and blurred vision.  She states increased heart rate, palpitations, hot flashes and chest pain are associated symptoms.  There are no triggers that she notices.  It can happen at anytime. She can be sitting or up doing things around the house.  Testing:  Orthostatics  Positive   Lyin/84  Sitting  118/71  Standing 124/87    Sitting to Standing positive 16 points   Lying to Sitting 19 points     EEG  Impression:  This essentially normal adult awake, drowsy and asleep EEG  No seizures but EEG like this does not rule out epilepsy    MRI Brain  IMPRESSION:  Impression:  Examination appears within normal limits.  Electronically Signed:  Yousif Oc    7/16/2023 7:29 AM EDT    Workstation ID: [id number]  Prohance     7/21/2023 Tilt Table  Findings Initial EKG showed normal sinus. The patient exhibited no symptoms prior to the test.   Initial Tilt Findings Patient placed per protocol. EKG showed sinus tachycardia. The patient exhibited syncope.   Recovery Findings Recovery EKG showed normal sinus. The patient exhibited dizziness after the test.   Study Impression Test terminated early due to syncope. Patient had a syncopal episode with brief unresponsiveness with a tilt table test  No evidence of any significant hypotension bradycardia with the episode  Patient had a sinus tachycardia during the episode  Completed recovery after the syncopal episode with normal neurological exam  Plans to start patient on a p.o. beta-blocker and see if that will help with the patient's symptoms  Need for close monitoring and follow-up reviewed and discussed with patient        2D echo 6/2023 showed an EF = 51-55% with trace TR.       Adult Transthoracic Echo Complete W/ Cont if Necessary Per Protocol  Interpretation Summary    Left ventricular systolic function is normal. Calculated left ventricular EF = 54.9% Left ventricular ejection fraction appears to be 51 - 55%.    Estimated right ventricular systolic pressure from tricuspid regurgitation is normal (<35 mmHg).     ambulatory heart monitor.  She had several more syncopal episodes while wearing it, which only showed sinus tachycardia.     The following portions of the patient's history were reviewed and updated as appropriate: allergies, current medications, past family history, past medical history, past social history, past surgical history and problem list.      History reviewed. No pertinent family history.    Past Medical History:   Diagnosis Date    Anxiety     Asthma     Bipolar affective disorder in full remission     Depression     Depression     Fatty liver disease, nonalcoholic     Hernia of  abdominal cavity     Hypertension     Menses, irregular        Social History     Socioeconomic History    Marital status:    Tobacco Use    Smoking status: Never     Passive exposure: Never    Smokeless tobacco: Never   Vaping Use    Vaping Use: Never used   Substance and Sexual Activity    Alcohol use: Yes     Alcohol/week: 1.0 standard drink of alcohol     Types: 1 Glasses of wine per week     Comment: occ    Drug use: Never    Sexual activity: Yes     Partners: Male         Current Outpatient Medications:     albuterol sulfate  (90 Base) MCG/ACT inhaler, Inhale 2 puffs Every 4 (Four) Hours As Needed for Wheezing., Disp: , Rfl:     atenolol (TENORMIN) 50 MG tablet, Take 0.5 tablets by mouth Daily., Disp: 30 tablet, Rfl: 0    FLUoxetine (PROzac) 20 MG tablet, Take 1 tablet by mouth Daily., Disp: , Rfl:     pantoprazole (PROTONIX) 40 MG EC tablet, Take 1 tablet by mouth Daily., Disp: , Rfl:     Topamax 100 MG tablet, Take 1 tablet by mouth Daily., Disp: , Rfl:     Review of Systems   Constitutional:  Positive for fatigue.   Genitourinary:  Positive for menstrual problem, pelvic pain and vaginal bleeding.   Musculoskeletal:  Positive for back pain.   Allergic/Immunologic: Positive for environmental allergies.   Neurological:  Positive for dizziness, syncope, light-headedness, numbness and headaches.        I have reviewed ROS completed by medical assistant.     Objective:    Neurologic Exam     Mental Status   Oriented to person, place, and time.   Oriented to person.   Oriented to place.   Oriented to time.   Attention: normal. Concentration: normal.   Speech: speech is normal   Level of consciousness: alert  Knowledge: good and consistent with education.   Normal comprehension.     Cranial Nerves     CN II   Visual fields full to confrontation.   Visual acuity: normal  Right visual field deficit: none  Left visual field deficit: none     CN III, IV, VI   Pupils are equal, round, and reactive to  light.  Extraocular motions are normal.   Right pupil: Shape: regular. Reactivity: brisk. Consensual response: intact. Accommodation: intact.   Left pupil: Shape: regular. Reactivity: brisk. Consensual response: intact. Accommodation: intact.   CN III: no CN III palsy  CN VI: no CN VI palsy  Nystagmus: none   Diplopia: none  Ophthalmoparesis: none  Upgaze: normal  Downgaze: normal  Conjugate gaze: present  Vestibulo-ocular reflex: present    CN V   Facial sensation intact.     CN VII   Facial expression full, symmetric.     CN VIII   CN VIII normal.     CN IX, X   CN IX normal.   CN X normal.   Palate: symmetric    CN XI   CN XI normal.   Right sternocleidomastoid strength: normal  Left sternocleidomastoid strength: normal  Right trapezius strength: normal  Left trapezius strength: normal    CN XII   CN XII normal.   Tongue: not atrophic    Motor Exam   Muscle bulk: normal  Overall muscle tone: normal  Right arm tone: normal  Left arm tone: normal  Right arm pronator drift: absent  Left arm pronator drift: absent  Right leg tone: normal  Left leg tone: normal    Strength   Strength 5/5 throughout.   Right neck flexion: 5/5  Left neck flexion: 5/5  Right neck extension: 5/5  Left neck extension: 5/5  Right deltoid: 5/5  Left deltoid: 5/5  Right biceps: 5/5  Left biceps: 5/5  Right triceps: 5/5  Left triceps: 5/5  Right quadriceps: 5/5  Left quadriceps: 5/5  Right hamstrin/5  Left hamstrin/5  Right glutei: 5/5  Left glutei: 5/5  Right anterior tibial: 5/5  Left anterior tibial: 5/5  Right posterior tibial: 5/5  Left posterior tibial: 5/5  Right peroneal: 5/5  Left peroneal: 5/5  Right gastroc: 5/5  Left gastroc: 5/5    Sensory Exam   Light touch normal.   Right leg vibration: normal  Left leg vibration: normal  Pinprick normal.   Right arm pinprick: Pinprick decreased right hand plantar surface all fingers.  Left arm pinprick: normal  Right leg pinprick: Pinprick decreased sensation right plantar  surface.  Left leg pinprick: normal  Graphesthesia: normal  Stereognosis: normal    Gait, Coordination, and Reflexes     Gait  Gait: normal    Coordination   Romberg: negative  Finger to nose coordination: normal  Heel to shin coordination: normal  Tandem walking coordination: normal    Tremor   Resting tremor: absent  Intention tremor: absent  Action tremor: absent    Reflexes   Right brachioradialis: 1+  Left brachioradialis: 1+  Right biceps: 1+  Left biceps: 1+  Right triceps: 1+  Left triceps: 1+  Right patellar: 2+  Left patellar: 2+  Right achilles: 2+  Left achilles: 2+  Right plantar: normal  Left plantar: normal  Right Calvert: absent  Left Calvert: absent  Right ankle clonus: absent  Left ankle clonus: absent  Right pendular knee jerk: absent  Left pendular knee jerk: absentOrthostatic testing  Lying 137/84  Sitting 118/71  Standing 124/87  Testing positive sitting to standing 16 point difference, 10 or greater positive.  Systolic lying to sitting 19 points difference, 20 points or greater is positive.      Physical Exam  Vitals reviewed.   Constitutional:       Appearance: Normal appearance. She is well-developed and well-groomed. She is morbidly obese.   HENT:      Head: Normocephalic and atraumatic.      Right Ear: Hearing normal.      Left Ear: Hearing normal.      Nose: Nose normal.      Mouth/Throat:      Lips: Pink.      Mouth: Mucous membranes are moist.   Eyes:      General: Lids are normal. No visual field deficit.     Extraocular Movements: EOM normal.      Right eye: Normal extraocular motion and no nystagmus.      Left eye: Normal extraocular motion and no nystagmus.      Pupils: Pupils are equal, round, and reactive to light.   Cardiovascular:      Rate and Rhythm: Normal rate.      Heart sounds: Normal heart sounds, S1 normal and S2 normal.   Pulmonary:      Effort: Pulmonary effort is normal.      Breath sounds: Normal breath sounds and air entry.   Musculoskeletal:         General: Normal  range of motion.      Cervical back: Full passive range of motion without pain and normal range of motion.   Skin:     General: Skin is warm and dry.          Neurological:      Mental Status: She is alert and oriented to person, place, and time.      Cranial Nerves: No dysarthria or facial asymmetry.      Sensory: Sensory deficit (Right hand Palmar area right foot plantar area decreased to sharp) present.      Motor: Motor strength is normal.Tremor present. No weakness, atrophy, abnormal muscle tone, seizure activity or pronator drift.      Coordination: Romberg sign negative. Coordination normal. Finger-Nose-Finger Test, Heel to Shin Test, Romberg Test and Heel to Shin Test normal. Rapid alternating movements normal.      Gait: Gait is intact. Gait and tandem walk normal.      Deep Tendon Reflexes:      Reflex Scores:       Tricep reflexes are 1+ on the right side and 1+ on the left side.       Bicep reflexes are 1+ on the right side and 1+ on the left side.       Brachioradialis reflexes are 1+ on the right side and 1+ on the left side.       Patellar reflexes are 2+ on the right side and 2+ on the left side.       Achilles reflexes are 2+ on the right side and 2+ on the left side.  Psychiatric:         Attention and Perception: Attention and perception normal.         Mood and Affect: Mood normal.         Speech: Speech normal.         Behavior: Behavior is cooperative.     Assessment/Plan:  Discussion: The patient will be referred to the [facility] autonomic program.  Both the patient and her  were in agreement to this referral.  Otherwise the patient will be referred back to the primary care and hopefully will get some answers at [facility] on this disorder.  She can come back to neurology here on a as needed basis.    Diagnoses and all orders for this visit:    1. Syncope and collapse (Primary)  -     Ambulatory Referral to Neurology        Return if symptoms worsen or fail to improve.    I  spent 40 minutes caring for Jazmine on this date of service. This time includes time spent by me in the following activities: reviewing tests, obtaining and/or reviewing a separately obtained history, performing a medically appropriate examination and/or evaluation, counseling and educating the patient/family/caregiver, ordering medications, tests, or procedures, referring and communicating with other health care professionals, and documenting information in the medical record.      This document has been electronically signed by Odalys MALDONADO on January 31, 2024 10:50 EST

## 2024-01-30 PROCEDURE — 88305 TISSUE EXAM BY PATHOLOGIST: CPT | Performed by: OBSTETRICS & GYNECOLOGY

## 2024-01-31 ENCOUNTER — OFFICE VISIT (OUTPATIENT)
Dept: NEUROLOGY | Facility: CLINIC | Age: 29
End: 2024-01-31
Payer: COMMERCIAL

## 2024-01-31 ENCOUNTER — LAB REQUISITION (OUTPATIENT)
Dept: LAB | Facility: HOSPITAL | Age: 29
End: 2024-01-31
Payer: COMMERCIAL

## 2024-01-31 VITALS
DIASTOLIC BLOOD PRESSURE: 87 MMHG | WEIGHT: 293 LBS | HEART RATE: 96 BPM | SYSTOLIC BLOOD PRESSURE: 124 MMHG | HEIGHT: 66 IN | BODY MASS INDEX: 47.09 KG/M2

## 2024-01-31 DIAGNOSIS — R55 SYNCOPE AND COLLAPSE: Primary | ICD-10-CM

## 2024-01-31 DIAGNOSIS — N93.8 OTHER SPECIFIED ABNORMAL UTERINE AND VAGINAL BLEEDING: ICD-10-CM

## 2024-01-31 PROCEDURE — 99215 OFFICE O/P EST HI 40 MIN: CPT | Performed by: NURSE PRACTITIONER

## 2024-01-31 RX ORDER — TOPIRAMATE 100 MG
1 TABLET ORAL EVERY 24 HOURS
COMMUNITY
Start: 2023-12-07

## 2024-01-31 RX ORDER — ALBUTEROL SULFATE 90 UG/1
2 AEROSOL, METERED RESPIRATORY (INHALATION) EVERY 4 HOURS PRN
COMMUNITY

## 2024-02-01 LAB
LAB AP CASE REPORT: NORMAL
PATH REPORT.FINAL DX SPEC: NORMAL
PATH REPORT.GROSS SPEC: NORMAL

## 2024-07-29 RX ORDER — ATENOLOL 25 MG/1
25 TABLET ORAL DAILY
Qty: 30 TABLET | Refills: 0 | Status: SHIPPED | OUTPATIENT
Start: 2024-07-29

## 2024-08-29 RX ORDER — ATENOLOL 25 MG/1
25 TABLET ORAL DAILY
Qty: 30 TABLET | Refills: 0 | Status: SHIPPED | OUTPATIENT
Start: 2024-08-29

## 2024-12-01 PROCEDURE — 93298 REM INTERROG DEV EVAL SCRMS: CPT | Performed by: INTERNAL MEDICINE

## 2025-02-01 PROCEDURE — 93298 REM INTERROG DEV EVAL SCRMS: CPT | Performed by: INTERNAL MEDICINE

## 2025-07-06 LAB
MDC_IDC_PG_IMPLANT_DTM: NORMAL
MDC_IDC_PG_MFG: NORMAL
MDC_IDC_PG_MODEL: NORMAL
MDC_IDC_PG_SERIAL: NORMAL
MDC_IDC_PG_TYPE: NORMAL
MDC_IDC_SESS_DTM: NORMAL
MDC_IDC_SESS_TYPE: NORMAL
